# Patient Record
Sex: MALE | URBAN - METROPOLITAN AREA
[De-identification: names, ages, dates, MRNs, and addresses within clinical notes are randomized per-mention and may not be internally consistent; named-entity substitution may affect disease eponyms.]

---

## 2017-02-23 ENCOUNTER — TRANSFERRED RECORDS (OUTPATIENT)
Dept: HEALTH INFORMATION MANAGEMENT | Facility: CLINIC | Age: 62
End: 2017-02-23

## 2017-02-28 ENCOUNTER — TRANSFERRED RECORDS (OUTPATIENT)
Dept: HEALTH INFORMATION MANAGEMENT | Facility: CLINIC | Age: 62
End: 2017-02-28

## 2017-03-01 ENCOUNTER — TRANSFERRED RECORDS (OUTPATIENT)
Dept: HEALTH INFORMATION MANAGEMENT | Facility: CLINIC | Age: 62
End: 2017-03-01

## 2017-03-11 ENCOUNTER — TRANSFERRED RECORDS (OUTPATIENT)
Dept: HEALTH INFORMATION MANAGEMENT | Facility: CLINIC | Age: 62
End: 2017-03-11

## 2017-03-16 ENCOUNTER — TRANSFERRED RECORDS (OUTPATIENT)
Dept: HEALTH INFORMATION MANAGEMENT | Facility: CLINIC | Age: 62
End: 2017-03-16

## 2017-03-18 ENCOUNTER — TRANSFERRED RECORDS (OUTPATIENT)
Dept: HEALTH INFORMATION MANAGEMENT | Facility: CLINIC | Age: 62
End: 2017-03-18

## 2017-03-20 ENCOUNTER — PRE VISIT (OUTPATIENT)
Dept: ORTHOPEDICS | Facility: CLINIC | Age: 62
End: 2017-03-20

## 2017-03-20 ENCOUNTER — MEDICAL CORRESPONDENCE (OUTPATIENT)
Dept: HEALTH INFORMATION MANAGEMENT | Facility: CLINIC | Age: 62
End: 2017-03-20

## 2017-03-20 NOTE — TELEPHONE ENCOUNTER
1.  Date/reason for appt:  3/22/17  Spiral Fracture Humerus    2.  Referring provider:  Dr Leonard, [facility] Alonso Weinstein    3.  Call to patient (Yes / No - short description):  Per , addl recent records/imaging at [facility]    4.  Previous care at / records requested from:  [facility] and [facility]

## 2017-03-21 ENCOUNTER — TRANSFERRED RECORDS (OUTPATIENT)
Dept: HEALTH INFORMATION MANAGEMENT | Facility: CLINIC | Age: 62
End: 2017-03-21

## 2017-03-21 NOTE — TELEPHONE ENCOUNTER
RN calling the patient and spoke with his wife Olivia, who does all the appt scheduling.  RN asked them to bring a disc to their appointment tomorrow.  They will come today, just because they are not familiar with the area.  She will seek out the disc with imaging today.  She says that an xr of the shoulder, a bone scan and a CT was done.  Niraj has male breast cancer with bone mets.  [place] referral was also made for them today and RN provided names of hotels in the area for them also.  They have no further questions or concerns at this time.

## 2017-03-21 NOTE — TELEPHONE ENCOUNTER
Records received from [facility].   Included  Office notes: 2/13/17(left leg note), 2/23/17, 2/24/17, 2/27/17, 3/1/17  Radiology reports: bone scan on 2/23/17   CT abdomen pelvis on 2/28/17   CT chest on 2/28/17  Other: labs

## 2017-03-22 ENCOUNTER — OFFICE VISIT (OUTPATIENT)
Dept: ORTHOPEDICS | Facility: CLINIC | Age: 62
End: 2017-03-22

## 2017-03-22 VITALS — WEIGHT: 295.6 LBS | HEIGHT: 69 IN | BODY MASS INDEX: 43.78 KG/M2

## 2017-03-22 DIAGNOSIS — M84.521A PATHOLOGICAL FRACTURE OF RIGHT HUMERUS DUE TO NEOPLASTIC DISEASE, INITIAL ENCOUNTER: Primary | ICD-10-CM

## 2017-03-22 RX ORDER — VITAMIN E 268 MG
CAPSULE ORAL
COMMUNITY

## 2017-03-22 RX ORDER — ATENOLOL AND CHLORTHALIDONE TABLET 50; 25 MG/1; MG/1
1 TABLET ORAL DAILY
COMMUNITY

## 2017-03-22 RX ORDER — AMLODIPINE BESYLATE 10 MG/1
10 TABLET ORAL DAILY
Status: ON HOLD | COMMUNITY
End: 2017-05-17

## 2017-03-22 RX ORDER — NICOTINE POLACRILEX 2 MG
1 GUM BUCCAL DAILY
COMMUNITY

## 2017-03-22 RX ORDER — DOCUSATE SODIUM 100 MG/1
CAPSULE, LIQUID FILLED ORAL DAILY
COMMUNITY

## 2017-03-22 RX ORDER — MULTIVITAMIN WITH IRON
1 TABLET ORAL 3 TIMES DAILY
COMMUNITY

## 2017-03-22 RX ORDER — POTASSIUM CHLORIDE 1.5 G/1.58G
20 POWDER, FOR SOLUTION ORAL 3 TIMES DAILY
COMMUNITY

## 2017-03-22 ASSESSMENT — ENCOUNTER SYMPTOMS
JOINT SWELLING: 0
MYALGIAS: 1
NECK PAIN: 0
BACK PAIN: 0
ARTHRALGIAS: 1
MUSCLE WEAKNESS: 0
MUSCLE CRAMPS: 0
STIFFNESS: 1

## 2017-03-22 NOTE — PROGRESS NOTES
This 61-year-old man is a history of breast cancer. He has metastases into the bones. He has had this diagnosis for about 3 years. He is getting hormonal therapy. He's had some treatments that have not worked and so he is getting some more cytotoxic therapy and has experienced neutropenia. 10 days ago he was moving a very heavy barn door and broke his right humerus after putting maximal exertion into pushing the door. He has not had any numbness or tingling in the hand. He has noticed some bruising and mild swelling. I reviewed several dozen pages of outside records. I have also reviewed his patient surveys in the EMR.    On examination he is alert oriented has a normal mood and affect and is in no acute distress. His heart has a regular rate and rhythm respirations are regular and unlabored eyes are nonicteric and reactive. In his right upper extremity he's able to move the fingers and wrists fully with some discomfort. There is no edema or swelling of the hand. He has normal sensation in the hand and hand is well-perfused. Shoulder and elbow exam are deferred secondary to pain. The patient is wearing a functional brace on the upper humerus that comes over the top of the shoulder.    I reviewed his x-ray and bone scan. The x-ray shows a long oblique fracture beginning distal to the shoulder joint. Bone scan shows a sclerotic lesions involve the humeral heads. This could be the breast cancer.    This patient is currently receiving chemotherapy. He may benefit from internal fixation of the fracture. I like to get an MRI to see how involved the bone is distal to the humeral head where the bone scan was active. On the other hand this is a very long fracture and he has not been radiated. If there is significant portions of the fracture that are not involved with tumor he may have the healing capacity. In this scenario we would not need to interrupt his chemotherapy. If surgery is needed he would have to be off of  chemotherapy for 2 weeks prior in 2 weeks after the surgery to do this safely. The patient stands these considerations. I will present treatment plan with him after reviewing his MRI.

## 2017-03-22 NOTE — MR AVS SNAPSHOT
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 Bring a list of your current medicines to your exam (including vitamins, minerals and over-the-counter drugs). Also bring the results of similar scans you may have had.  Please remove any body piercings and hair extensions before you arrive.  Follow your doctor s orders. If you do not, we may have to postpone your exam.  You will not have contrast for this exam. You do not need to do anything special to prepare.  The MRI machine uses a strong magnet. Please wear clothes without metal (snaps, zippers). A sweatsuit works well, or we may give you a hospital gown.   **IMPORTANT** THE INSTRUCTIONS BELOW ARE ONLY FOR THOSE PATIENTS WHO HAVE BEEN TOLD THEY WILL RECEIVE SEDATION OR GENERAL ANESTHESIA DURING THEIR MRI PROCEDURE:  IF YOU WILL RECEIVE SEDATION (take medicine to help you relax during your exam):   You must get the medicine from your doctor before you arrive. Bring the medicine to the exam. Do not take it at home.   Arrive one hour early. Bring someone who can take you home after the test. Your medicine will make you sleepy. After the exam, you may not drive, take a bus or take a taxi by yourself.   No eating 8 hours before  your exam. You may have clear liquids up until 4 hours before your exam. (Clear liquids include water, clear tea, black coffee and fruit juice without pulp.)  IF YOU WILL RECEIVE ANESTHESIA (be asleep for your exam):   Arrive 1 1/2 hours early. Bring someone who can take you home after the test. You may not drive, take a bus or take a taxi by yourself.   No eating 8 hours before your exam. You may have clear liquids up until 4 hours before your exam. (Clear liquids include water, clear tea, black coffee and fruit juice without pulp.)   You will spend four to five hours in the recovery room.  Please call the Imaging Department at your exam site with any questions.              Future tests that were ordered for you today     Open Future Orders        Priority Expected Expires Ordered    MR Humerus Right w/o Contrast Routine  3/22/2018 3/22/2017            Who to contact     Please call your clinic at [phone] to:    Ask questions about your health    Make or cancel appointments    Discuss your medicines    Learn about your test results    Speak to your doctor   If you have compliments or concerns about an experience at your clinic, or if you wish to file a complaint, please contact [facility] Patient Relations at [phone] or email us at [email or web address]         Additional Information About Your Visit        [employer] Information     [employer] is an electronic gateway that provides easy, online access to your medical records. With [employer], you can request a clinic appointment, read your test results, renew a prescription or communicate with your care team.     To sign up for [employer] visit the website at [email or web address]   You will be asked to enter the access code listed below, as well as some personal information. Please follow the directions to create your username and password.     Your access code is: [email or web address]  Expires: 6/19/2017  6:30 AM     Your access  "code will  in 90 days. If you need help or a new code, please contact your [facility] or call [phone] for assistance.        Care EveryWhere ID     This is your Care EveryWhere ID. This could be used by other organizations to access your [place] medical records  [id number]        Your Vitals Were     Height BMI (Body Mass Index)                1.753 m (5' 9\") 43.65 kg/m2           Blood Pressure from Last 3 Encounters:   No data found for BP    Weight from Last 3 Encounters:   17 134.1 kg (295 lb 9.6 oz)               Primary Care Provider    None Specified       No primary provider on file.        Thank you!     Thank you for choosing [facility]  for your care. Our goal is always to provide you with excellent care. Hearing back from our patients is one way we can continue to improve our services. Please take a few minutes to complete the written survey that you may receive in the mail after your visit with us. Thank you!             Your Updated Medication List - Protect others around you: Learn how to safely use, store and throw away your medicines at www.disposemymeds.org.          This list is accurate as of: 3/22/17  9:04 AM.  Always use your most recent med list.                   Brand Name Dispense Instructions for use    atenolol-chlorthalidone 50-25 MG per tablet    TENORETIC     Take 1 tablet by mouth daily       Biotin 1 MG Caps          DEXAMETHASONE PO      Take 4 mg by mouth       docusate sodium 100 MG capsule    COLACE     Take by mouth daily       EMERGEN-C IMMUNE PO      Take by mouth as needed       flaxseed oil 1000 MG Caps          FOLIC ACID PO      Take 400 mcg by mouth daily       GABAPENTIN PO      Take 300 mg by mouth 2 times daily (before meals)       garlic 150 MG Tabs tablet      Take 150 mg by mouth daily       LACTOBACILLUS PO          leuprolide 30 MG kit    LUPRON DEPOT-PED     Inject 30 mg into the muscle every 4 " months       magnesium 250 MG tablet      Take 1 tablet by mouth 2 times daily       MELATONIN PO      Take 3 mg by mouth At Bedtime       NORVASC 10 MG tablet   Generic drug:  amLODIPine      Take 10 mg by mouth daily       OMEGA 3 PO      Take 1,000 mg by mouth       OMEPRAZOLE PO      Take 20 mg by mouth       potassium chloride 20 MEQ Packet    KLOR-CON     Take 20 mEq by mouth 2 times daily       PROCHLORPERAZINE MALEATE PO      Take 10 mg by mouth every 6 hours as needed for nausea or vomiting       TRAZODONE HCL PO      Take 100 mg by mouth At Bedtime       vitamin B complex with vitamin C Tabs tablet      Take 1 tablet by mouth daily       VITAMIN D-3 PO      Take 2,000 Units by mouth 2 times daily

## 2017-03-22 NOTE — LETTER
3/22/2017       RE: Niraj Pimentel  [address] [facility] [address]     Dear Colleague,    Thank you for referring your patient, Niraj Pimentel, to the [facility] at [facility] Please see a copy of my visit note below.    This 61-year-old man is a history of breast cancer. He has metastases into the bones. He has had this diagnosis for about 3 years. He is getting hormonal therapy. He's had some treatments that have not worked and so he is getting some more cytotoxic therapy and has experienced neutropenia. 10 days ago he was moving a very heavy barn door and broke his right humerus after putting maximal exertion into pushing the door. He has not had any numbness or tingling in the hand. He has noticed some bruising and mild swelling. I reviewed several dozen pages of outside records. I have also reviewed his patient surveys in the EMR.    On examination he is alert oriented has a normal mood and affect and is in no acute distress. His heart has a regular rate and rhythm respirations are regular and unlabored eyes are nonicteric and reactive. In his right upper extremity he's able to move the fingers and wrists fully with some discomfort. There is no edema or swelling of the hand. He has normal sensation in the hand and hand is well-perfused. Shoulder and elbow exam are deferred secondary to pain. The patient is wearing a functional brace on the upper humerus that comes over the top of the shoulder.    I reviewed his x-ray and bone scan. The x-ray shows a long oblique fracture beginning distal to the shoulder joint. Bone scan shows a sclerotic lesions involve the humeral heads. This could be the breast cancer.    This patient is currently receiving chemotherapy. He may benefit from internal fixation of the fracture. I like to get an MRI to see how involved the bone is distal to the humeral head where the bone scan was active. On the other hand this is a very long  fracture and he has not been radiated. If there is significant portions of the fracture that are not involved with tumor he may have the healing capacity. In this scenario we would not need to interrupt his chemotherapy. If surgery is needed he would have to be off of chemotherapy for 2 weeks prior in 2 weeks after the surgery to do this safely. The patient stands these considerations. I will present treatment plan with him after reviewing his MRI.    Again, thank you for allowing me to participate in the care of your patient.      Sincerely,    Camacho Nguyen MD

## 2017-03-24 NOTE — TELEPHONE ENCOUNTER
Received imaging disc from [facility] (xray Humerus Right-3/11/17), sent to clinic.  Duplicate

## 2017-05-04 ENCOUNTER — TRANSFERRED RECORDS (OUTPATIENT)
Dept: HEALTH INFORMATION MANAGEMENT | Facility: CLINIC | Age: 62
End: 2017-05-04

## 2017-05-12 RX ORDER — NYSTATIN 100000 U/G
1 CREAM TOPICAL DAILY PRN
COMMUNITY

## 2017-05-12 RX ORDER — SENNOSIDES 8.6 MG
1 CAPSULE ORAL 3 TIMES DAILY
COMMUNITY

## 2017-05-15 ENCOUNTER — APPOINTMENT (OUTPATIENT)
Dept: GENERAL RADIOLOGY | Facility: CLINIC | Age: 62
DRG: 493 | End: 2017-05-15
Attending: ORTHOPAEDIC SURGERY
Payer: COMMERCIAL

## 2017-05-15 ENCOUNTER — ANESTHESIA (OUTPATIENT)
Dept: SURGERY | Facility: CLINIC | Age: 62
DRG: 493 | End: 2017-05-15
Payer: COMMERCIAL

## 2017-05-15 ENCOUNTER — APPOINTMENT (OUTPATIENT)
Dept: GENERAL RADIOLOGY | Facility: CLINIC | Age: 62
DRG: 493 | End: 2017-05-15
Attending: INTERNAL MEDICINE
Payer: COMMERCIAL

## 2017-05-15 ENCOUNTER — HOSPITAL ENCOUNTER (INPATIENT)
Facility: CLINIC | Age: 62
LOS: 2 days | Discharge: HOME | DRG: 493 | End: 2017-05-17
Attending: ORTHOPAEDIC SURGERY | Admitting: ORTHOPAEDIC SURGERY
Payer: COMMERCIAL

## 2017-05-15 ENCOUNTER — ANESTHESIA EVENT (OUTPATIENT)
Dept: SURGERY | Facility: CLINIC | Age: 62
DRG: 493 | End: 2017-05-15
Payer: COMMERCIAL

## 2017-05-15 DIAGNOSIS — Z98.890 S/P SHOULDER SURGERY: Primary | ICD-10-CM

## 2017-05-15 DIAGNOSIS — R06.00 DYSPNEA, UNSPECIFIED TYPE: ICD-10-CM

## 2017-05-15 DIAGNOSIS — I10 BENIGN ESSENTIAL HYPERTENSION: ICD-10-CM

## 2017-05-15 LAB
ABO + RH BLD: NORMAL
ABO + RH BLD: NORMAL
ANION GAP SERPL CALCULATED.3IONS-SCNC: 10 MMOL/L (ref 3–14)
BLD GP AB SCN SERPL QL: NORMAL
BLOOD BANK CMNT PATIENT-IMP: NORMAL
BUN SERPL-MCNC: 19 MG/DL (ref 7–30)
CALCIUM SERPL-MCNC: 9.7 MG/DL (ref 8.5–10.1)
CHLORIDE SERPL-SCNC: 100 MMOL/L (ref 94–109)
CO2 SERPL-SCNC: 28 MMOL/L (ref 20–32)
CREAT SERPL-MCNC: 0.9 MG/DL (ref 0.66–1.25)
CREAT SERPL-MCNC: NORMAL MG/DL (ref 0.66–1.25)
GFR SERPL CREATININE-BSD FRML MDRD: 86 ML/MIN/1.7M2
GFR SERPL CREATININE-BSD FRML MDRD: NORMAL ML/MIN/1.7M2
GLUCOSE BLDC GLUCOMTR-MCNC: 121 MG/DL (ref 70–99)
GLUCOSE BLDC GLUCOMTR-MCNC: 137 MG/DL (ref 70–99)
GLUCOSE SERPL-MCNC: 139 MG/DL (ref 70–99)
MAGNESIUM SERPL-MCNC: 1.9 MG/DL (ref 1.6–2.3)
POTASSIUM SERPL-SCNC: 3.5 MMOL/L (ref 3.4–5.3)
POTASSIUM SERPL-SCNC: NORMAL MMOL/L (ref 3.4–5.3)
SODIUM SERPL-SCNC: 138 MMOL/L (ref 133–144)
SPECIMEN EXP DATE BLD: NORMAL

## 2017-05-15 PROCEDURE — 25000128 H RX IP 250 OP 636: Performed by: NURSE ANESTHETIST, CERTIFIED REGISTERED

## 2017-05-15 PROCEDURE — 71010 XR CHEST PORT 1 VW: CPT

## 2017-05-15 PROCEDURE — 88342 IMHCHEM/IMCYTCHM 1ST ANTB: CPT | Mod: 26 | Performed by: ORTHOPAEDIC SURGERY

## 2017-05-15 PROCEDURE — 71000014 ZZH RECOVERY PHASE 1 LEVEL 2 FIRST HR: Performed by: ORTHOPAEDIC SURGERY

## 2017-05-15 PROCEDURE — 40000278 XR SURGERY CARM FLUORO LESS THAN 5 MIN: Mod: TC

## 2017-05-15 PROCEDURE — 86901 BLOOD TYPING SEROLOGIC RH(D): CPT | Performed by: ORTHOPAEDIC SURGERY

## 2017-05-15 PROCEDURE — 36000066 ZZH SURGERY LEVEL 4 W FLUORO 1ST 30 MIN - UMMC: Performed by: ORTHOPAEDIC SURGERY

## 2017-05-15 PROCEDURE — 37000008 ZZH ANESTHESIA TECHNICAL FEE, 1ST 30 MIN: Performed by: ORTHOPAEDIC SURGERY

## 2017-05-15 PROCEDURE — C9290 INJ, BUPIVACAINE LIPOSOME: HCPCS | Performed by: ANESTHESIOLOGY

## 2017-05-15 PROCEDURE — 88341 IMHCHEM/IMCYTCHM EA ADD ANTB: CPT | Performed by: ORTHOPAEDIC SURGERY

## 2017-05-15 PROCEDURE — 27211024 ZZHC OR SUPPLY OTHER OPNP: Performed by: ORTHOPAEDIC SURGERY

## 2017-05-15 PROCEDURE — 25000128 H RX IP 250 OP 636: Performed by: ANESTHESIOLOGY

## 2017-05-15 PROCEDURE — 12000001 ZZH R&B MED SURG/OB UMMC

## 2017-05-15 PROCEDURE — 27210794 ZZH OR GENERAL SUPPLY STERILE: Performed by: ORTHOPAEDIC SURGERY

## 2017-05-15 PROCEDURE — 88307 TISSUE EXAM BY PATHOLOGIST: CPT | Mod: 26,59 | Performed by: ORTHOPAEDIC SURGERY

## 2017-05-15 PROCEDURE — 25000132 ZZH RX MED GY IP 250 OP 250 PS 637: Performed by: ORTHOPAEDIC SURGERY

## 2017-05-15 PROCEDURE — 40000986 XR HUMERUS PORT RT: Mod: RT

## 2017-05-15 PROCEDURE — 83735 ASSAY OF MAGNESIUM: CPT | Performed by: INTERNAL MEDICINE

## 2017-05-15 PROCEDURE — 37000009 ZZH ANESTHESIA TECHNICAL FEE, EACH ADDTL 15 MIN: Performed by: ORTHOPAEDIC SURGERY

## 2017-05-15 PROCEDURE — 25000132 ZZH RX MED GY IP 250 OP 250 PS 637: Performed by: INTERNAL MEDICINE

## 2017-05-15 PROCEDURE — 0PSC04Z REPOSITION RIGHT HUMERAL HEAD WITH INTERNAL FIXATION DEVICE, OPEN APPROACH: ICD-10-PCS | Performed by: ORTHOPAEDIC SURGERY

## 2017-05-15 PROCEDURE — 71000015 ZZH RECOVERY PHASE 1 LEVEL 2 EA ADDTL HR: Performed by: ORTHOPAEDIC SURGERY

## 2017-05-15 PROCEDURE — 25000128 H RX IP 250 OP 636: Performed by: ORTHOPAEDIC SURGERY

## 2017-05-15 PROCEDURE — 27810169 ZZH OR IMPLANT GENERAL: Performed by: ORTHOPAEDIC SURGERY

## 2017-05-15 PROCEDURE — 88342 IMHCHEM/IMCYTCHM 1ST ANTB: CPT | Mod: XU | Performed by: ORTHOPAEDIC SURGERY

## 2017-05-15 PROCEDURE — 40000170 ZZH STATISTIC PRE-PROCEDURE ASSESSMENT II: Performed by: ORTHOPAEDIC SURGERY

## 2017-05-15 PROCEDURE — 00000146 ZZHCL STATISTIC GLUCOSE BY METER IP

## 2017-05-15 PROCEDURE — 88360 TUMOR IMMUNOHISTOCHEM/MANUAL: CPT | Mod: 26,59 | Performed by: ORTHOPAEDIC SURGERY

## 2017-05-15 PROCEDURE — 86850 RBC ANTIBODY SCREEN: CPT | Performed by: ORTHOPAEDIC SURGERY

## 2017-05-15 PROCEDURE — 25000125 ZZHC RX 250: Performed by: NURSE ANESTHETIST, CERTIFIED REGISTERED

## 2017-05-15 PROCEDURE — 80048 BASIC METABOLIC PNL TOTAL CA: CPT | Performed by: INTERNAL MEDICINE

## 2017-05-15 PROCEDURE — 00000159 ZZHCL STATISTIC H-SEND OUTS PREP: Performed by: ORTHOPAEDIC SURGERY

## 2017-05-15 PROCEDURE — 25000566 ZZH SEVOFLURANE, EA 15 MIN: Performed by: ORTHOPAEDIC SURGERY

## 2017-05-15 PROCEDURE — 88307 TISSUE EXAM BY PATHOLOGIST: CPT | Performed by: ORTHOPAEDIC SURGERY

## 2017-05-15 PROCEDURE — 25000125 ZZHC RX 250: Performed by: ANESTHESIOLOGY

## 2017-05-15 PROCEDURE — 88377 M/PHMTRC ALYS ISHQUANT/SEMIQ: CPT | Performed by: PATHOLOGY

## 2017-05-15 PROCEDURE — 99222 1ST HOSP IP/OBS MODERATE 55: CPT | Performed by: INTERNAL MEDICINE

## 2017-05-15 PROCEDURE — 88341 IMHCHEM/IMCYTCHM EA ADD ANTB: CPT | Mod: 26 | Performed by: ORTHOPAEDIC SURGERY

## 2017-05-15 PROCEDURE — P9041 ALBUMIN (HUMAN),5%, 50ML: HCPCS | Performed by: NURSE ANESTHETIST, CERTIFIED REGISTERED

## 2017-05-15 PROCEDURE — C9399 UNCLASSIFIED DRUGS OR BIOLOG: HCPCS | Performed by: NURSE ANESTHETIST, CERTIFIED REGISTERED

## 2017-05-15 PROCEDURE — C1713 ANCHOR/SCREW BN/BN,TIS/BN: HCPCS | Performed by: ORTHOPAEDIC SURGERY

## 2017-05-15 PROCEDURE — 25800025 ZZH RX 258: Performed by: NURSE ANESTHETIST, CERTIFIED REGISTERED

## 2017-05-15 PROCEDURE — 88360 TUMOR IMMUNOHISTOCHEM/MANUAL: CPT | Performed by: ORTHOPAEDIC SURGERY

## 2017-05-15 PROCEDURE — 36000064 ZZH SURGERY LEVEL 4 EA 15 ADDTL MIN - UMMC: Performed by: ORTHOPAEDIC SURGERY

## 2017-05-15 PROCEDURE — 86900 BLOOD TYPING SEROLOGIC ABO: CPT | Performed by: ORTHOPAEDIC SURGERY

## 2017-05-15 DEVICE — BONE CEMENT SIMPLEX FULL DOSE 6191-1-001: Type: IMPLANTABLE DEVICE | Site: ARM | Status: FUNCTIONAL

## 2017-05-15 DEVICE — IMPLANTABLE DEVICE: Type: IMPLANTABLE DEVICE | Site: ARM | Status: FUNCTIONAL

## 2017-05-15 RX ORDER — POLYETHYLENE GLYCOL 3350 17 G/17G
17 POWDER, FOR SOLUTION ORAL DAILY
Status: DISCONTINUED | OUTPATIENT
Start: 2017-05-15 | End: 2017-05-17 | Stop reason: HOSPADM

## 2017-05-15 RX ORDER — MORPHINE SULFATE 15 MG/1
30 TABLET, FILM COATED, EXTENDED RELEASE ORAL
Status: DISCONTINUED | OUTPATIENT
Start: 2017-05-15 | End: 2017-05-17 | Stop reason: HOSPADM

## 2017-05-15 RX ORDER — ACETAMINOPHEN 325 MG/1
650 TABLET ORAL EVERY 4 HOURS PRN
Status: DISCONTINUED | OUTPATIENT
Start: 2017-05-18 | End: 2017-05-17 | Stop reason: HOSPADM

## 2017-05-15 RX ORDER — BUPIVACAINE HYDROCHLORIDE AND EPINEPHRINE 2.5; 5 MG/ML; UG/ML
INJECTION, SOLUTION INFILTRATION; PERINEURAL PRN
Status: DISCONTINUED | OUTPATIENT
Start: 2017-05-15 | End: 2017-05-15

## 2017-05-15 RX ORDER — ALBUMIN, HUMAN INJ 5% 5 %
SOLUTION INTRAVENOUS CONTINUOUS PRN
Status: DISCONTINUED | OUTPATIENT
Start: 2017-05-15 | End: 2017-05-15

## 2017-05-15 RX ORDER — HYDROMORPHONE HYDROCHLORIDE 1 MG/ML
.3-.5 INJECTION, SOLUTION INTRAMUSCULAR; INTRAVENOUS; SUBCUTANEOUS
Status: DISCONTINUED | OUTPATIENT
Start: 2017-05-15 | End: 2017-05-17 | Stop reason: HOSPADM

## 2017-05-15 RX ORDER — CYCLOBENZAPRINE HCL 5 MG
10 TABLET ORAL 3 TIMES DAILY PRN
Status: DISCONTINUED | OUTPATIENT
Start: 2017-05-15 | End: 2017-05-17 | Stop reason: HOSPADM

## 2017-05-15 RX ORDER — SODIUM CHLORIDE, SODIUM LACTATE, POTASSIUM CHLORIDE, CALCIUM CHLORIDE 600; 310; 30; 20 MG/100ML; MG/100ML; MG/100ML; MG/100ML
INJECTION, SOLUTION INTRAVENOUS CONTINUOUS
Status: DISCONTINUED | OUTPATIENT
Start: 2017-05-15 | End: 2017-05-15 | Stop reason: HOSPADM

## 2017-05-15 RX ORDER — LIDOCAINE 40 MG/G
CREAM TOPICAL
Status: DISCONTINUED | OUTPATIENT
Start: 2017-05-15 | End: 2017-05-17 | Stop reason: HOSPADM

## 2017-05-15 RX ORDER — LANOLIN ALCOHOL/MO/W.PET/CERES
400 CREAM (GRAM) TOPICAL DAILY
Status: DISCONTINUED | OUTPATIENT
Start: 2017-05-16 | End: 2017-05-17 | Stop reason: HOSPADM

## 2017-05-15 RX ORDER — TRAZODONE HYDROCHLORIDE 50 MG/1
50 TABLET, FILM COATED ORAL AT BEDTIME
Status: DISCONTINUED | OUTPATIENT
Start: 2017-05-15 | End: 2017-05-17 | Stop reason: HOSPADM

## 2017-05-15 RX ORDER — FENTANYL CITRATE 50 UG/ML
25-50 INJECTION, SOLUTION INTRAMUSCULAR; INTRAVENOUS
Status: DISCONTINUED | OUTPATIENT
Start: 2017-05-15 | End: 2017-05-15 | Stop reason: HOSPADM

## 2017-05-15 RX ORDER — ONDANSETRON 2 MG/ML
4 INJECTION INTRAMUSCULAR; INTRAVENOUS EVERY 30 MIN PRN
Status: DISCONTINUED | OUTPATIENT
Start: 2017-05-15 | End: 2017-05-15 | Stop reason: HOSPADM

## 2017-05-15 RX ORDER — PROPOFOL 10 MG/ML
INJECTION, EMULSION INTRAVENOUS PRN
Status: DISCONTINUED | OUTPATIENT
Start: 2017-05-15 | End: 2017-05-15

## 2017-05-15 RX ORDER — SODIUM CHLORIDE, SODIUM LACTATE, POTASSIUM CHLORIDE, CALCIUM CHLORIDE 600; 310; 30; 20 MG/100ML; MG/100ML; MG/100ML; MG/100ML
INJECTION, SOLUTION INTRAVENOUS CONTINUOUS PRN
Status: DISCONTINUED | OUTPATIENT
Start: 2017-05-15 | End: 2017-05-15

## 2017-05-15 RX ORDER — ONDANSETRON 4 MG/1
4 TABLET, ORALLY DISINTEGRATING ORAL EVERY 30 MIN PRN
Status: DISCONTINUED | OUTPATIENT
Start: 2017-05-15 | End: 2017-05-15 | Stop reason: HOSPADM

## 2017-05-15 RX ORDER — ONDANSETRON 2 MG/ML
4 INJECTION INTRAMUSCULAR; INTRAVENOUS EVERY 6 HOURS PRN
Status: DISCONTINUED | OUTPATIENT
Start: 2017-05-15 | End: 2017-05-17 | Stop reason: HOSPADM

## 2017-05-15 RX ORDER — FENTANYL CITRATE 50 UG/ML
INJECTION, SOLUTION INTRAMUSCULAR; INTRAVENOUS PRN
Status: DISCONTINUED | OUTPATIENT
Start: 2017-05-15 | End: 2017-05-15

## 2017-05-15 RX ORDER — ONDANSETRON 2 MG/ML
INJECTION INTRAMUSCULAR; INTRAVENOUS PRN
Status: DISCONTINUED | OUTPATIENT
Start: 2017-05-15 | End: 2017-05-15

## 2017-05-15 RX ORDER — NALOXONE HYDROCHLORIDE 0.4 MG/ML
.1-.4 INJECTION, SOLUTION INTRAMUSCULAR; INTRAVENOUS; SUBCUTANEOUS
Status: DISCONTINUED | OUTPATIENT
Start: 2017-05-15 | End: 2017-05-17 | Stop reason: HOSPADM

## 2017-05-15 RX ORDER — LANOLIN ALCOHOL/MO/W.PET/CERES
3 CREAM (GRAM) TOPICAL AT BEDTIME
Status: DISCONTINUED | OUTPATIENT
Start: 2017-05-15 | End: 2017-05-15

## 2017-05-15 RX ORDER — POTASSIUM CHLORIDE 750 MG/1
20 TABLET, EXTENDED RELEASE ORAL ONCE
Status: COMPLETED | OUTPATIENT
Start: 2017-05-15 | End: 2017-05-15

## 2017-05-15 RX ORDER — CEFAZOLIN SODIUM 2 G/100ML
2 INJECTION, SOLUTION INTRAVENOUS EVERY 8 HOURS
Status: COMPLETED | OUTPATIENT
Start: 2017-05-15 | End: 2017-05-16

## 2017-05-15 RX ORDER — OXYCODONE HYDROCHLORIDE 5 MG/1
10-15 TABLET ORAL
Status: DISCONTINUED | OUTPATIENT
Start: 2017-05-15 | End: 2017-05-16

## 2017-05-15 RX ORDER — ATENOLOL 25 MG/1
50 TABLET ORAL DAILY
Status: DISCONTINUED | OUTPATIENT
Start: 2017-05-16 | End: 2017-05-17 | Stop reason: HOSPADM

## 2017-05-15 RX ORDER — LIDOCAINE HYDROCHLORIDE 20 MG/ML
INJECTION, SOLUTION INFILTRATION; PERINEURAL PRN
Status: DISCONTINUED | OUTPATIENT
Start: 2017-05-15 | End: 2017-05-15

## 2017-05-15 RX ORDER — ONDANSETRON 4 MG/1
4 TABLET, ORALLY DISINTEGRATING ORAL EVERY 6 HOURS PRN
Status: DISCONTINUED | OUTPATIENT
Start: 2017-05-15 | End: 2017-05-17 | Stop reason: HOSPADM

## 2017-05-15 RX ORDER — NYSTATIN 100000 U/G
CREAM TOPICAL 4 TIMES DAILY PRN
Status: DISCONTINUED | OUTPATIENT
Start: 2017-05-15 | End: 2017-05-17 | Stop reason: HOSPADM

## 2017-05-15 RX ORDER — SODIUM CHLORIDE, SODIUM LACTATE, POTASSIUM CHLORIDE, CALCIUM CHLORIDE 600; 310; 30; 20 MG/100ML; MG/100ML; MG/100ML; MG/100ML
INJECTION, SOLUTION INTRAVENOUS CONTINUOUS
Status: DISCONTINUED | OUTPATIENT
Start: 2017-05-15 | End: 2017-05-17 | Stop reason: HOSPADM

## 2017-05-15 RX ORDER — NALOXONE HYDROCHLORIDE 0.4 MG/ML
.1-.4 INJECTION, SOLUTION INTRAMUSCULAR; INTRAVENOUS; SUBCUTANEOUS
Status: DISCONTINUED | OUTPATIENT
Start: 2017-05-15 | End: 2017-05-15 | Stop reason: HOSPADM

## 2017-05-15 RX ORDER — AMOXICILLIN 250 MG
1-2 CAPSULE ORAL 2 TIMES DAILY
Status: DISCONTINUED | OUTPATIENT
Start: 2017-05-15 | End: 2017-05-17 | Stop reason: HOSPADM

## 2017-05-15 RX ORDER — MEPERIDINE HYDROCHLORIDE 25 MG/ML
12.5 INJECTION INTRAMUSCULAR; INTRAVENOUS; SUBCUTANEOUS
Status: DISCONTINUED | OUTPATIENT
Start: 2017-05-15 | End: 2017-05-15 | Stop reason: HOSPADM

## 2017-05-15 RX ORDER — CEFAZOLIN SODIUM 1 G/50ML
3 SOLUTION INTRAVENOUS
Status: COMPLETED | OUTPATIENT
Start: 2017-05-15 | End: 2017-05-15

## 2017-05-15 RX ORDER — AMLODIPINE BESYLATE 5 MG/1
5 TABLET ORAL DAILY
Status: DISCONTINUED | OUTPATIENT
Start: 2017-05-16 | End: 2017-05-17 | Stop reason: HOSPADM

## 2017-05-15 RX ORDER — HYDROXYZINE HYDROCHLORIDE 25 MG/1
25 TABLET, FILM COATED ORAL EVERY 6 HOURS PRN
Status: DISCONTINUED | OUTPATIENT
Start: 2017-05-15 | End: 2017-05-15

## 2017-05-15 RX ORDER — BIOTIN 1 MG
1000 TABLET ORAL DAILY
Status: DISCONTINUED | OUTPATIENT
Start: 2017-05-16 | End: 2017-05-17 | Stop reason: HOSPADM

## 2017-05-15 RX ORDER — CEFAZOLIN SODIUM 1 G/3ML
1 INJECTION, POWDER, FOR SOLUTION INTRAMUSCULAR; INTRAVENOUS SEE ADMIN INSTRUCTIONS
Status: DISCONTINUED | OUTPATIENT
Start: 2017-05-15 | End: 2017-05-15 | Stop reason: HOSPADM

## 2017-05-15 RX ORDER — ACETAMINOPHEN 325 MG/1
975 TABLET ORAL EVERY 8 HOURS
Status: DISCONTINUED | OUTPATIENT
Start: 2017-05-15 | End: 2017-05-17 | Stop reason: HOSPADM

## 2017-05-15 RX ORDER — GABAPENTIN 300 MG/1
300 CAPSULE ORAL 3 TIMES DAILY
Status: DISCONTINUED | OUTPATIENT
Start: 2017-05-15 | End: 2017-05-17 | Stop reason: HOSPADM

## 2017-05-15 RX ADMIN — SENNOSIDES AND DOCUSATE SODIUM 2 TABLET: 8.6; 5 TABLET ORAL at 14:51

## 2017-05-15 RX ADMIN — BUPIVACAINE 10 ML: 13.3 INJECTION, SUSPENSION, LIPOSOMAL INFILTRATION at 08:31

## 2017-05-15 RX ADMIN — FENTANYL CITRATE 25 MCG: 50 INJECTION, SOLUTION INTRAMUSCULAR; INTRAVENOUS at 11:11

## 2017-05-15 RX ADMIN — OXYCODONE HYDROCHLORIDE 10 MG: 5 TABLET ORAL at 17:59

## 2017-05-15 RX ADMIN — SODIUM CHLORIDE, POTASSIUM CHLORIDE, SODIUM LACTATE AND CALCIUM CHLORIDE: 600; 310; 30; 20 INJECTION, SOLUTION INTRAVENOUS at 08:13

## 2017-05-15 RX ADMIN — OXYCODONE HYDROCHLORIDE 10 MG: 5 TABLET ORAL at 21:33

## 2017-05-15 RX ADMIN — SODIUM CHLORIDE, POTASSIUM CHLORIDE, SODIUM LACTATE AND CALCIUM CHLORIDE: 600; 310; 30; 20 INJECTION, SOLUTION INTRAVENOUS at 10:40

## 2017-05-15 RX ADMIN — MIDAZOLAM HYDROCHLORIDE 2 MG: 1 INJECTION, SOLUTION INTRAMUSCULAR; INTRAVENOUS at 08:13

## 2017-05-15 RX ADMIN — GABAPENTIN 300 MG: 300 CAPSULE ORAL at 20:25

## 2017-05-15 RX ADMIN — FENTANYL CITRATE 100 MCG: 50 INJECTION, SOLUTION INTRAMUSCULAR; INTRAVENOUS at 08:23

## 2017-05-15 RX ADMIN — FENTANYL CITRATE 50 MCG: 50 INJECTION, SOLUTION INTRAMUSCULAR; INTRAVENOUS at 10:56

## 2017-05-15 RX ADMIN — CEFAZOLIN SODIUM 2 G: 2 INJECTION, SOLUTION INTRAVENOUS at 18:43

## 2017-05-15 RX ADMIN — Medication 3 G: at 08:51

## 2017-05-15 RX ADMIN — POLYETHYLENE GLYCOL 3350 17 G: 17 POWDER, FOR SOLUTION ORAL at 20:25

## 2017-05-15 RX ADMIN — BUPIVACAINE HYDROCHLORIDE AND EPINEPHRINE BITARTRATE 8 ML: 2.5; .005 INJECTION, SOLUTION INFILTRATION; PERINEURAL at 08:32

## 2017-05-15 RX ADMIN — SUGAMMADEX 200 MG: 100 INJECTION, SOLUTION INTRAVENOUS at 11:00

## 2017-05-15 RX ADMIN — MORPHINE SULFATE 30 MG: 15 TABLET, EXTENDED RELEASE ORAL at 20:25

## 2017-05-15 RX ADMIN — ACETAMINOPHEN 975 MG: 325 TABLET, FILM COATED ORAL at 21:33

## 2017-05-15 RX ADMIN — CHOLECALCIFEROL TAB 25 MCG (1000 UNIT) 2000 UNITS: 25 TAB at 20:25

## 2017-05-15 RX ADMIN — ALBUMIN (HUMAN): 12.5 SOLUTION INTRAVENOUS at 10:02

## 2017-05-15 RX ADMIN — SODIUM CHLORIDE, POTASSIUM CHLORIDE, SODIUM LACTATE AND CALCIUM CHLORIDE: 600; 310; 30; 20 INJECTION, SOLUTION INTRAVENOUS at 17:00

## 2017-05-15 RX ADMIN — FENTANYL CITRATE 50 MCG: 50 INJECTION, SOLUTION INTRAMUSCULAR; INTRAVENOUS at 09:09

## 2017-05-15 RX ADMIN — FENTANYL CITRATE 25 MCG: 50 INJECTION, SOLUTION INTRAMUSCULAR; INTRAVENOUS at 11:05

## 2017-05-15 RX ADMIN — Medication 50 MG: at 08:41

## 2017-05-15 RX ADMIN — SENNOSIDES AND DOCUSATE SODIUM 2 TABLET: 8.6; 5 TABLET ORAL at 20:25

## 2017-05-15 RX ADMIN — FENTANYL CITRATE 50 MCG: 50 INJECTION, SOLUTION INTRAMUSCULAR; INTRAVENOUS at 11:35

## 2017-05-15 RX ADMIN — CEFAZOLIN 1 G: 1 INJECTION, POWDER, FOR SOLUTION INTRAMUSCULAR; INTRAVENOUS at 10:51

## 2017-05-15 RX ADMIN — ACETAMINOPHEN 975 MG: 325 TABLET, FILM COATED ORAL at 14:51

## 2017-05-15 RX ADMIN — PROPOFOL 200 MG: 10 INJECTION, EMULSION INTRAVENOUS at 08:41

## 2017-05-15 RX ADMIN — HYDROMORPHONE HYDROCHLORIDE 0.3 MG: 1 INJECTION, SOLUTION INTRAMUSCULAR; INTRAVENOUS; SUBCUTANEOUS at 12:14

## 2017-05-15 RX ADMIN — POTASSIUM CHLORIDE 20 MEQ: 750 TABLET, FILM COATED, EXTENDED RELEASE ORAL at 17:59

## 2017-05-15 RX ADMIN — HYDROMORPHONE HYDROCHLORIDE 0.3 MG: 1 INJECTION, SOLUTION INTRAMUSCULAR; INTRAVENOUS; SUBCUTANEOUS at 12:02

## 2017-05-15 RX ADMIN — ONDANSETRON 4 MG: 2 INJECTION INTRAMUSCULAR; INTRAVENOUS at 10:44

## 2017-05-15 RX ADMIN — TRAZODONE HYDROCHLORIDE 50 MG: 50 TABLET ORAL at 21:33

## 2017-05-15 RX ADMIN — OXYCODONE HYDROCHLORIDE 10 MG: 5 TABLET ORAL at 14:51

## 2017-05-15 RX ADMIN — LIDOCAINE HYDROCHLORIDE 60 MG: 20 INJECTION, SOLUTION INFILTRATION; PERINEURAL at 08:41

## 2017-05-15 NOTE — ANESTHESIA CARE TRANSFER NOTE
Patient: Niraj Pimentel    Procedure(s):  Right Proximal Humerus Tumor Curettage and Internal Fixation of Pathologic Fracture - Wound Class: I-Clean    Diagnosis: Pathologic Fracture Right Humerus  Diagnosis Additional Information: No value filed.    Anesthesia Type:   General, ETT     Note:  Airway :Face Mask  Patient transferred to:PACU        Vitals: (Last set prior to Anesthesia Care Transfer)    CRNA VITALS  5/15/2017 1052 - 5/15/2017 1127      5/15/2017             Resp Rate (set): 10                Electronically Signed By: Charles Patrick Schlatter, APRN CRNA  May 15, 2017  11:27 AM

## 2017-05-15 NOTE — OR NURSING
Pain under control from block but continues to have pain under his arm.  Toterating X rays and general movement in bed

## 2017-05-15 NOTE — ANESTHESIA POSTPROCEDURE EVALUATION
Patient: Niraj Pimentel    Procedure(s):  Right Proximal Humerus Tumor Curettage and Internal Fixation of Pathologic Fracture - Wound Class: I-Clean    Diagnosis:Pathologic Fracture Right Humerus  Diagnosis Additional Information: No value filed.    Anesthesia Type:  General, ETT    Note:  Anesthesia Post Evaluation    Patient location during evaluation: PACU and Bedside  Patient participation: Able to fully participate in evaluation  Level of consciousness: awake and alert  Pain management: adequate  Airway patency: patent  Cardiovascular status: acceptable  Respiratory status: acceptable  Hydration status: balanced  PONV: none     Anesthetic complications: None          Last vitals:  Vitals:    05/15/17 1430 05/15/17 1500 05/15/17 1525   BP: 111/64 143/69    Resp:   23   Temp:   36.7  C (98  F)   SpO2:   93%         Electronically Signed By: Bhargavi Yoon MD  May 15, 2017  4:09 PM

## 2017-05-15 NOTE — IP AVS SNAPSHOT
MRN:[id number]                      After Visit Summary   5/15/2017    Niraj Pimentel    MRN: [id number]           Thank you!     Thank you for choosing [facility] for your care. Our goal is always to provide you with excellent care. Hearing back from our patients is one way we can continue to improve our services. Please take a few minutes to complete the written survey that you may receive in the mail after you visit with us. Thank you!        Patient Information     Date Of Birth          1955        Designated Caregiver       Most Recent Value    Caregiver    Will someone help with your care after discharge? yes    Name of designated caregiver Olivia Pimentel    Phone number of caregiver [phone]    Caregiver address [address]      About your hospital stay     You were admitted on:  May 15, 2017 You last received care in the:  [facility] 8[facility]    You were discharged on:  May 17, 2017        Reason for your hospital stay       61M with metastatic breast cancer and pathologic humerus fx s/p ORIF                  Who to Call     For medical emergencies, please call 911.  For non-urgent questions about your medical care, please call your primary care provider or clinic, [phone]  For questions related to your surgery, please call your surgery clinic        Attending Provider     Provider Camacho Marie MD Orthopaedic Surgery       Primary Care Provider Office Phone # Fax #    Genaro AVINA Aly [phone]       [place] [id number]        After Care Instructions     Discharge Instructions       Post Operative Instructions for Niraj Pimentel is a 61 year old male    Surgery: Right humerus ORIF on 5/15/17    If you have any questions contact Dr. Nguyen at the following number: [facility] call [phone].    Follow up appointment:   You are scheduled to follow up with Dr. Nguyen approximately 2weeks after your surgery.            Anticoagulation:   Continue to walk and stay active to help minimize your risk of blood clot.    Activity:   -continue non weight bearing to right upper extremity, active ROM to shoulder, continue with active elbow/wrist/hand.   -use the sling as needed for comfort    Pain Medications:   -Take pain medications as prescribed.     -Do not drive while on pain medications or until your leg feels well enough to do so.      Bandage Care and Showering:   -You can shower with the adhesive bandage covering your shoulder at the time of discharge.    -Remove the adhesive bandage 10 days after your surgery.  This can be removed at home.  Once removed, it is common to have a few scabs.  Let the scabs fall off on their own - they will do so over the next week or two.  The sutures are resorbable and nothing needs to be removed.    --Once the bandage is removed, you can shower without covering the incision.  Do not soak or bathe the incision in water.  Do not scrub the incision.  Just let the water from the shower run over the incision.    --Contact Dr. Nguyen or his staff if there is any drainage from the incision or redness.    Shoulder Swelling and Icing  -Continue icing the shoulder 5-6 times per day for approximately 20 minutes each time.  This will help with swelling.  Keep the arm elevated with pillows to prevent dependent swelling.    Contact clinic if you note any of the following:  -Fevers greater than 101.5 chills  -Increased pain, redness, swelling or discharge at the surgical site.   -During regular business hours call Dr Nguyen's office and request to speak with his nurse or the triage nurses.   -After hours or on weekends call the hospital  at [phone] and ask to speak with the Orthopaedic resident on call.            Discharge Instructions       Monitor oxygen sat at night.  Notify MD if consistently < 88%   Overnight oximetry tracing tonight on RA -assess O2 requirement  Resume tenoretic and  "potassium 5/18  Primary MD appointment 5/18 or 5/19 to recheck BP, BMP, hgb and oxygen levels.  Continued use of incentive spirometer.            Oxygen Adult       Discharge Instructions        Monitor oxygen sat at night.  Notify MD if consistently < 88%   Overnight oximetry tracing tonight on RA -assess O2 requirement  Primary MD appointment 5/18 or 5/19 to recheck BP, BMP, hgb and oxygen levels.  Continued use of incentive spirometer.                  Your next 10 appointments already scheduled     May 31, 2017  8:45 AM CDT   (Arrive by 8:30 AM)   Return Visit with Camacho Nguyen MD   [facility] ([facility])    [address]  4th Floor  [facility] [address] [phone]              Pending Results     Date and Time Order Name Status Description    5/15/2017 0934 Surgical pathology exam In process             Statement of Approval     Ordered          05/17/17 1041  I have reviewed and agree with all the recommendations and orders detailed in this document.  EFFECTIVE NOW     Approved and electronically signed by:  Camacho Nguyen MD           05/17/17 1213  I have reviewed and agree with all the recommendations and orders detailed in this document.     Approved and electronically signed by:  Oswald Oreilly MD             Admission Information     Date & Time Provider Department Dept. Phone    5/15/2017 Camacho Nguyen MD [facility] 8A [phone]      Your Vitals Were     Blood Pressure Pulse Temperature Respirations Height Weight    143/77 97 98.4  F (36.9  C) 16 1.778 m (5' 10\") 130.5 kg (287 lb 11.2 oz)    Pulse Oximetry BMI (Body Mass Index)                94% 41.28 kg/m2          [employer] Information     [employer] lets you send messages to your doctor, view your test results, renew your prescriptions, schedule appointments and more. To sign up, go to [email or web address] . Click on \"Log in\" on the left side of the " "screen, which will take you to the Welcome page. Then click on \"Sign up Now\" on the right side of the page.     You will be asked to enter the access code listed below, as well as some personal information. Please follow the directions to create your username and password.     Your access code is: [email or web address]  Expires: 2017  6:30 AM     Your access code will  in 90 days. If you need help or a new code, please call your [place] clinic or [phone].        Care EveryWhere ID     This is your Care EveryWhere ID. This could be used by other organizations to access your [place] medical records  [id number]           Review of your medicines      CONTINUE these medicines which may have CHANGED, or have new prescriptions. If we are uncertain of the size of tablets/capsules you have at home, strength may be listed as something that might have changed.        Dose / Directions    amLODIPine 5 MG tablet   Commonly known as:  NORVASC   This may have changed:    - medication strength  - how much to take   Used for:  Benign essential hypertension        Dose:  5 mg   Take 1 tablet (5 mg) by mouth daily   Quantity:  15 tablet   Refills:  0       traZODone 50 MG tablet   Commonly known as:  DESYREL   This may have changed:  how much to take        Dose:  50 mg   Take 1 tablet (50 mg) by mouth At Bedtime   Quantity:  90 tablet   Refills:  0         CONTINUE these medicines which have NOT CHANGED        Dose / Directions    atenolol-chlorthalidone 50-25 MG per tablet   Commonly known as:  TENORETIC        Dose:  1 tablet   Take 1 tablet by mouth daily   Refills:  0       Biotin 1 MG Caps        Dose:  1 tablet   Take 1 tablet by mouth daily   Refills:  0       docusate sodium 100 MG capsule   Commonly known as:  COLACE        Take by mouth daily   Refills:  0       EMERGEN-C IMMUNE PO        Take by mouth as needed   Refills:  0       flaxseed oil 1000 MG Caps        Refills:  0       FOLIC ACID PO        Dose:  400 " mcg   Take 400 mcg by mouth daily   Refills:  0       GABAPENTIN PO        Dose:  300 mg   Take 300 mg by mouth 3 times daily   Refills:  0       garlic 150 MG Tabs tablet        Dose:  150 mg   Take 150 mg by mouth daily   Refills:  0       LACTOBACILLUS PO        Dose:  1 tablet   Take 1 tablet by mouth daily   Refills:  0       leuprolide 30 MG kit   Commonly known as:  LUPRON DEPOT-PED        Dose:  30 mg   Inject 30 mg into the muscle every 4 months Last dose 2/2017   Refills:  0       magnesium 250 MG tablet        Dose:  1 tablet   Take 1 tablet by mouth 3 times daily   Refills:  0       MELATONIN PO        Dose:  3 mg   Take 3 mg by mouth At Bedtime   Refills:  0       MORPHINE SULFATE PO        Dose:  30 mg   Take 30 mg by mouth 2 times daily   Refills:  0       nystatin cream   Commonly known as:  MYCOSTATIN        Dose:  1 applicator   Apply 1 applicator topically daily as needed for dry skin   Refills:  0       OMEGA 3 PO        Dose:  1000 mg   Take 1,000 mg by mouth   Refills:  0       OMEPRAZOLE PO        Dose:  20 mg   Take 20 mg by mouth   Refills:  0       OXYCODONE HCL PO        Dose:  5 mg   Take 5 mg by mouth every 4 hours as needed   Refills:  0       potassium chloride 20 MEQ Packet   Commonly known as:  KLOR-CON        Dose:  20 mEq   Take 20 mEq by mouth 3 times daily   Refills:  0       Senna 8.6 MG Caps        Dose:  1 tablet   Take 1 tablet by mouth 3 times daily   Refills:  0       vitamin B complex with vitamin C Tabs tablet        Dose:  1 tablet   Take 1 tablet by mouth daily   Refills:  0       VITAMIN D-3 PO        Dose:  2000 Units   Take 2,000 Units by mouth 2 times daily   Refills:  0         STOP taking     CEFUROXIME AXETIL PO           DEXAMETHASONE PO           PROCHLORPERAZINE MALEATE PO                Where to get your medicines      These medications were sent to [facility] [place], [place] - [address]  [address] [address]      Phone:  [phone]     amLODIPine 5 MG tablet                Protect others around you: Learn how to safely use, store and throw away your medicines at www.disposemymeds.org.             Medication List: This is a list of all your medications and when to take them. Check marks below indicate your daily home schedule. Keep this list as a reference.      Medications           Morning Afternoon Evening Bedtime As Needed    amLODIPine 5 MG tablet   Commonly known as:  NORVASC   Take 1 tablet (5 mg) by mouth daily   Last time this was given:  5 mg on 5/17/2017  9:01 AM                                atenolol-chlorthalidone 50-25 MG per tablet   Commonly known as:  TENORETIC   Take 1 tablet by mouth daily                                Biotin 1 MG Caps   Take 1 tablet by mouth daily                                docusate sodium 100 MG capsule   Commonly known as:  COLACE   Take by mouth daily                                EMERGEN-C IMMUNE PO   Take by mouth as needed                                flaxseed oil 1000 MG Caps                                FOLIC ACID PO   Take 400 mcg by mouth daily   Last time this was given:  400 mcg on 5/17/2017  9:01 AM                                GABAPENTIN PO   Take 300 mg by mouth 3 times daily   Last time this was given:  300 mg on 5/17/2017  1:27 PM                                garlic 150 MG Tabs tablet   Take 150 mg by mouth daily                                LACTOBACILLUS PO   Take 1 tablet by mouth daily                                leuprolide 30 MG kit   Commonly known as:  LUPRON DEPOT-PED   Inject 30 mg into the muscle every 4 months Last dose 2/2017                                magnesium 250 MG tablet   Take 1 tablet by mouth 3 times daily                                MELATONIN PO   Take 3 mg by mouth At Bedtime                                MORPHINE SULFATE PO   Take 30 mg by mouth 2 times daily                                nystatin cream   Commonly  known as:  MYCOSTATIN   Apply 1 applicator topically daily as needed for dry skin                                OMEGA 3 PO   Take 1,000 mg by mouth                                OMEPRAZOLE PO   Take 20 mg by mouth   Last time this was given:  20 mg on 5/17/2017  6:42 AM                                OXYCODONE HCL PO   Take 5 mg by mouth every 4 hours as needed   Last time this was given:  10 mg on 5/16/2017  6:14 AM                                potassium chloride 20 MEQ Packet   Commonly known as:  KLOR-CON   Take 20 mEq by mouth 3 times daily                                Senna 8.6 MG Caps   Take 1 tablet by mouth 3 times daily                                traZODone 50 MG tablet   Commonly known as:  DESYREL   Take 1 tablet (50 mg) by mouth At Bedtime   Last time this was given:  50 mg on 5/16/2017 10:04 PM                                vitamin B complex with vitamin C Tabs tablet   Take 1 tablet by mouth daily   Last time this was given:  1 tablet on 5/17/2017  9:02 AM                                VITAMIN D-3 PO   Take 2,000 Units by mouth 2 times daily

## 2017-05-15 NOTE — BRIEF OP NOTE
Orthopaedic Surgery Brief Op-Note     Patient: Niraj Pimentel  : 1955  Date of Service: 5/15/2017 11:04 AM    Pre-operative Diagnosis: right proximal humerus pathologic fracture  Post-operative Diagnosis: same    Procedure(s) Performed:   1.  Right proximal humerus ORIF  2.  Cement packing proximal humerus    Staff: Dr. Nguyen  Resident: Nadine  Assistant: Prabhakar    Anesthesia: Interscalene block, General  EBL: 150 cc  Tourniquet Time: n/a     Implants: [place] proximal humerus plate locking 8 plate, 12 x 3.5 screws  Drains: none  Intra-op Labs/Cxs/Specimens: proximal humerus lesion  Complications: none apparent  Findings: see dictated op note    Dispo: Stable to PACU, then to floor    Post-Op Plan:  Assessment/Plan: Niraj Pimentel is a 61M with hx metastatic breast cancer with pathologic right humerus fracture now s/p right proximal humerus ORIF with cement packing on 5/15/2017 with Dr. Nguyen.    Activity: Up with assist, 5 lbs lifting restriction  Weight bearing status: NWB RUE,    Antibiotics: Ancef x 24 hours.  Diet: Begin with clear fluids and progress diet as tolerated.   DVT prophylaxis: mechanical only  Bracing/Splinting: sling for comfort  Wound Care: aquacel x 7 days    Pain management: interscalene block, transition from IV to orals as tolerated.    X-rays: XR PACU right proximal humerus  Physical Therapy: sling for comfort, AROM only, no PROM, ADL's.   Occupational Therapy: eval and treat  Labs: Trend Hgb on POD #1, 2, 3   Cultures: Pending, follow culture results closely.   Consults: PT, OT. medicine, appreciate assistance in caring for this patient.   Follow-up: Clinic with Dr. Nguyen in 2 weeks     Disposition: Pending progress with therapies, pain control on orals, and medical stability, anticipate discharge to home on POD #2-3.    Future Appointments  Date Time Provider Department [place]   2017 8:00 AM Tara Rai OT UROT [place]   2017 1:00 PM Madalyn Ballard OT UROT  [place]   5/17/2017 8:30 AM Emilie Ely, PT URPT [place]   5/31/2017 8:45 AM Camacho Nguyen MD [facility]       Bry Mccoy MD  Orthopaedic Surgery PGY-4  [phone]      For questions about this patient, please attempt to contact me at my pager prior to contacting the orthopaedics resident on call. Thank you!

## 2017-05-15 NOTE — IP AVS SNAPSHOT
"    UR 8A: [phone]                                              INTERAGENCY TRANSFER FORM - PHYSICIAN ORDERS   5/15/2017                    Hospital Admission Date: 5/15/2017  JO GATES   : 1955  Sex: Male        Attending Provider: Camacho Nguyen MD     Allergies:  Lisinopril    Infection:  None   Service:  ORTHOPEDICS    Ht:  1.778 m (5' 10\")   Wt:  130.5 kg (287 lb 11.2 oz)   Admission Wt:  130.5 kg (287 lb 11.2 oz)    BMI:  41.28 kg/m 2   BSA:  2.54 m 2            Patient PCP Information     Provider PCP Type    [facility]      ED Clinical Impression     Diagnosis Description Comment Added By Time Added    S/P shoulder surgery [Z98.890] S/P shoulder surgery [Z98.890]  Bry Mccoy MD 2017  7:24 AM    Benign essential hypertension [I10] Benign essential hypertension [I10]  Oswald Oreilly MD 2017 12:03 PM    Dyspnea, unspecified type [R06.00] Dyspnea, unspecified type [R06.00]  Makenzie Hoang RN 2017 12:50 PM      Hospital Problems as of 2017              Priority Class Noted POA    S/P shoulder surgery Medium  5/15/2017 Yes      Non-Hospital Problems as of 2017     None      Code Status History     Date Active Date Inactive Code Status Order ID Comments User Context    2017  7:25 AM  Full Code [id number]  Bry Mccoy MD Outpatient    5/15/2017  1:29 PM 2017  7:25 AM Full Code [id number]  Bry Mccoy MD Inpatient         Medication Review      CONTINUE these medications which may have CHANGED, or have new prescriptions. If we are uncertain of the size of tablets/capsules you have at home, strength may be listed as something that might have changed.        Dose / Directions Comments    amLODIPine 5 MG tablet   Commonly known as:  NORVASC   This may have changed:    - medication strength  - how much to take   Used for:  Benign essential hypertension        Dose:  5 mg   Take 1 tablet (5 mg) by mouth " daily   Quantity:  15 tablet   Refills:  0        traZODone 50 MG tablet   Commonly known as:  DESYREL   This may have changed:  how much to take        Dose:  50 mg   Take 1 tablet (50 mg) by mouth At Bedtime   Quantity:  90 tablet   Refills:  0          CONTINUE these medications which have NOT CHANGED        Dose / Directions Comments    atenolol-chlorthalidone 50-25 MG per tablet   Commonly known as:  TENORETIC        Dose:  1 tablet   Take 1 tablet by mouth daily   Refills:  0        Biotin 1 MG Caps        Dose:  1 tablet   Take 1 tablet by mouth daily   Refills:  0        docusate sodium 100 MG capsule   Commonly known as:  COLACE        Take by mouth daily   Refills:  0        EMERGEN-C IMMUNE PO        Take by mouth as needed   Refills:  0        flaxseed oil 1000 MG Caps        Refills:  0        FOLIC ACID PO        Dose:  400 mcg   Take 400 mcg by mouth daily   Refills:  0        GABAPENTIN PO        Dose:  300 mg   Take 300 mg by mouth 3 times daily   Refills:  0        garlic 150 MG Tabs tablet        Dose:  150 mg   Take 150 mg by mouth daily   Refills:  0        LACTOBACILLUS PO        Dose:  1 tablet   Take 1 tablet by mouth daily   Refills:  0        leuprolide 30 MG kit   Commonly known as:  LUPRON DEPOT-PED        Dose:  30 mg   Inject 30 mg into the muscle every 4 months Last dose 2/2017   Refills:  0        magnesium 250 MG tablet        Dose:  1 tablet   Take 1 tablet by mouth 3 times daily   Refills:  0        MELATONIN PO        Dose:  3 mg   Take 3 mg by mouth At Bedtime   Refills:  0        MORPHINE SULFATE PO        Dose:  30 mg   Take 30 mg by mouth 2 times daily   Refills:  0        nystatin cream   Commonly known as:  MYCOSTATIN        Dose:  1 applicator   Apply 1 applicator topically daily as needed for dry skin   Refills:  0        OMEGA 3 PO        Dose:  1000 mg   Take 1,000 mg by mouth   Refills:  0        OMEPRAZOLE PO        Dose:  20 mg   Take 20 mg by mouth   Refills:  0         OXYCODONE HCL PO        Dose:  5 mg   Take 5 mg by mouth every 4 hours as needed   Refills:  0        potassium chloride 20 MEQ Packet   Commonly known as:  KLOR-CON        Dose:  20 mEq   Take 20 mEq by mouth 3 times daily   Refills:  0        Senna 8.6 MG Caps        Dose:  1 tablet   Take 1 tablet by mouth 3 times daily   Refills:  0        vitamin B complex with vitamin C Tabs tablet        Dose:  1 tablet   Take 1 tablet by mouth daily   Refills:  0        VITAMIN D-3 PO        Dose:  2000 Units   Take 2,000 Units by mouth 2 times daily   Refills:  0          STOP taking     CEFUROXIME AXETIL PO           DEXAMETHASONE PO           PROCHLORPERAZINE MALEATE PO                   Summary of Visit     Reason for your hospital stay       61M with metastatic breast cancer and pathologic humerus fx s/p ORIF             After Care     Discharge Instructions       Post Operative Instructions for Niraj Pimentel is a 61 year old male    Surgery: Right humerus ORIF on 5/15/17    If you have any questions contact Dr. Nguyen at the following number: [facility] call [phone].    Follow up appointment:   You are scheduled to follow up with Dr. Nguyen approximately 2weeks after your surgery.           Anticoagulation:   Continue to walk and stay active to help minimize your risk of blood clot.    Activity:   -continue non weight bearing to right upper extremity, active ROM to shoulder, continue with active elbow/wrist/hand.   -use the sling as needed for comfort    Pain Medications:   -Take pain medications as prescribed.     -Do not drive while on pain medications or until your leg feels well enough to do so.      Bandage Care and Showering:   -You can shower with the adhesive bandage covering your shoulder at the time of discharge.    -Remove the adhesive bandage 10 days after your surgery.  This can be removed at home.  Once removed, it is common to have a few scabs.  Let the scabs fall off on their own - they will  do so over the next week or two.  The sutures are resorbable and nothing needs to be removed.    --Once the bandage is removed, you can shower without covering the incision.  Do not soak or bathe the incision in water.  Do not scrub the incision.  Just let the water from the shower run over the incision.    --Contact Dr. Nguyen or his staff if there is any drainage from the incision or redness.    Shoulder Swelling and Icing  -Continue icing the shoulder 5-6 times per day for approximately 20 minutes each time.  This will help with swelling.  Keep the arm elevated with pillows to prevent dependent swelling.    Contact clinic if you note any of the following:  -Fevers greater than 101.5 chills  -Increased pain, redness, swelling or discharge at the surgical site.   -During regular business hours call Dr Nguyen's office and request to speak with his nurse or the triage nurses.   -After hours or on weekends call the hospital  at [phone] and ask to speak with the Orthopaedic resident on call.       Discharge Instructions       Monitor oxygen sat at night.  Notify MD if consistently < 88%   Overnight oximetry tracing tonight on RA -assess O2 requirement  Resume tenoretic and potassium 5/18  Primary MD appointment 5/18 or 5/19 to recheck BP, BMP, hgb and oxygen levels.  Continued use of incentive spirometer.             Procedures     Oxygen Adult       Discharge Instructions        Monitor oxygen sat at night.  Notify MD if consistently < 88%   Overnight oximetry tracing tonight on RA -assess O2 requirement  Primary MD appointment 5/18 or 5/19 to recheck BP, BMP, hgb and oxygen levels.  Continued use of incentive spirometer.   O2 overnight Oximetry Study             Your next 10 appointments already scheduled     May 31, 2017  8:45 AM CDT   (Arrive by 8:30 AM)   Return Visit with Camacho Nguyen MD   [facility] ([facility])    [address] [place] [address]  [facility] [address]   [phone]              Statement of Approval     Ordered          05/17/17 1041  I have reviewed and agree with all the recommendations and orders detailed in this document.  EFFECTIVE NOW     Approved and electronically signed by:  Camacho Nguyen MD           05/17/17 1213  I have reviewed and agree with all the recommendations and orders detailed in this document.     Approved and electronically signed by:  Oswald Oreilly MD

## 2017-05-15 NOTE — LETTER
Transition Communication Hand-off for Care Transitions to Next Level of Care Provider    Name: Niraj Pimentel  MRN #: [id number]  Primary Care Provider: JOSE DEMPSEY     Primary Clinic: [facility] [address] [place] [address] [address]  [facility] [address]     Reason for Hospitalization:  Pathologic Fracture Right Humerus  S/P shoulder surgery  Admit Date/Time: 5/15/2017  5:34 AM  Discharge Date: 5/147/2017  Payor Source: Payor: COMMERCIAL / Plan: MEDICARE ADVANTAGE / Product Type: Medicare /          Reason for Communication Hand-off Referral: Avoidable readmission within 30 days    Discharge Needs Assessment:  Needs       Most Recent Value    Equipment Currently Used at Home shower chair    Transportation Available family or friend will provide      Follow-up plan:  Future Appointments  Date Time Provider Department Center   5/31/2017 8:45 AM Camacho Nguyen MD [facility]     Procedures     Future Labs/Procedures    Oxygen Adult     Scheduling Instructions:    O2 overnight Oximetry Study    Comments:    Discharge Instructions        Monitor oxygen sat at night.  Notify MD if consistently < 88%   Overnight oximetry tracing tonight on RA -assess O2 requirement  Primary MD appointment 5/18 or 5/19 to recheck BP, BMP, hgb and oxygen levels.  Continued use of incentive spirometer.                Makenzie Hoang RN, BSN  Care Coordinator, 8A  Phone [phone]  Pager [phone]    AVS/Discharge Summary is the source of truth; this is a helpful guide for improved communication of patient story

## 2017-05-15 NOTE — ANESTHESIA PREPROCEDURE EVALUATION
Anesthesia Evaluation     . Pt has had prior anesthetic. Type: General    No history of anesthetic complications          ROS/MED HX    ENT/Pulmonary:  - neg pulmonary ROS    (-) sleep apnea   Neurologic:  - neg neurologic ROS     Cardiovascular:     (+) hypertension----. : . . . :. .       METS/Exercise Tolerance:     Hematologic:  - neg hematologic  ROS       Musculoskeletal:  - neg musculoskeletal ROS       GI/Hepatic:     (+) GERD Asymptomatic on medication,       Renal/Genitourinary:  - ROS Renal section negative       Endo:  - neg endo ROS       Psychiatric:  - neg psychiatric ROS       Infectious Disease:  - neg infectious disease ROS       Malignancy:   (+) Malignancy (Rt Humerus) History of Other          Other:                     Physical Exam  Normal systems: dental    Airway   Mallampati: II  TM distance: >3 FB  Neck ROM: full    Dental     Cardiovascular       Pulmonary                     Anesthesia Plan      History & Physical Review  History and physical reviewed and following examination; no interval change.    ASA Status:  2 .    NPO Status:  > 8 hours and > 2 hours    Plan for General and ETT with Intravenous induction. Maintenance will be Balanced.    PONV prophylaxis:  Ondansetron (or other 5HT-3)  Additional equipment: Videolaryngoscope and 2nd IV Pt to consider post op block for pain control.  Pt and wife deny snoring or MIAN. Reviewed GA risks.  All questions answered.  Pt agrees with plan and wishes to proceed.      Postoperative Care  Postoperative pain management:  IV analgesics and Oral pain medications.      Consents  Anesthetic plan, risks, benefits and alternatives discussed with:  Patient and Spouse..                          .

## 2017-05-15 NOTE — IP AVS SNAPSHOT
[facility] 8A    [address]    [facility] [address]    Phone:  [phone]                                       After Visit Summary   5/15/2017    Niraj Pimentel    MRN: [id number]           After Visit Summary Signature Page     I have received my discharge instructions, and my questions have been answered. I have discussed any challenges I see with this plan with the nurse or doctor.    ..........................................................................................................................................  Patient/Patient Representative Signature      ..........................................................................................................................................  Patient Representative Print Name and Relationship to Patient    ..................................................               ................................................  Date                                            Time    ..........................................................................................................................................  Reviewed by Signature/Title    ...................................................              ..............................................  Date                                                            Time

## 2017-05-15 NOTE — ANESTHESIA PROCEDURE NOTES
Peripheral Nerve Block Procedure Note    Staff:     Anesthesiologist:  SHWETHA ARECHIGA  Location: OR Before induction  Procedure Start/Stop TImes:     patient identified, IV checked, site marked, risks and benefits discussed, informed consent, monitors and equipment checked, pre-op evaluation, at physician/surgeon's request and post-op pain management      Correct Patient: Yes      Correct Position: Yes      Correct Site: Yes      Correct Procedure: Yes      Correct Laterality:  Yes    Site Marked:  Yes  Procedure details:     Procedure:  Interscalene    Laterality:  Right    Position:  Supine    Sterile Prep: chloraprep, alcohol swabs and mask      Local skin infiltration:  1% lidocaine    amount (mL):  1    Needle:  Insulated and short bevel    Needle gauge:  21    Needle length (inches):  4    Ultrasound: Yes      Ultrasound used to identify targeted nerve, plexus, or vascular structure and placed a needle adjacent to it      Permanent Image entered into patiient's record      Abnormal pain on injection: No      Blood Aspirated: No      Paresthesias:  No    Bolus via:  Needle    Infusion Method:  Single Shot    Complications:  None  Assessment/Narrative:     Injection made incrementally with aspirations every (mL):  5     Done in OR because surgeon was late to arrive for surgery and did not have any note or order regarding a block in patient's chart.     Shwetha Arechiga MD  May 15, 2017

## 2017-05-16 ENCOUNTER — APPOINTMENT (OUTPATIENT)
Dept: OCCUPATIONAL THERAPY | Facility: CLINIC | Age: 62
DRG: 493 | End: 2017-05-16
Attending: ORTHOPAEDIC SURGERY
Payer: COMMERCIAL

## 2017-05-16 LAB
ANION GAP SERPL CALCULATED.3IONS-SCNC: 9 MMOL/L (ref 3–14)
BUN SERPL-MCNC: 12 MG/DL (ref 7–30)
CALCIUM SERPL-MCNC: 8.6 MG/DL (ref 8.5–10.1)
CHLORIDE SERPL-SCNC: 99 MMOL/L (ref 94–109)
CO2 SERPL-SCNC: 31 MMOL/L (ref 20–32)
CREAT SERPL-MCNC: 0.74 MG/DL (ref 0.66–1.25)
ERYTHROCYTE [DISTWIDTH] IN BLOOD BY AUTOMATED COUNT: 21.3 % (ref 10–15)
GFR SERPL CREATININE-BSD FRML MDRD: ABNORMAL ML/MIN/1.7M2
GLUCOSE SERPL-MCNC: 125 MG/DL (ref 70–99)
HCT VFR BLD AUTO: 26.5 % (ref 40–53)
HGB BLD-MCNC: 8.1 G/DL (ref 13.3–17.7)
MAGNESIUM SERPL-MCNC: 1.8 MG/DL (ref 1.6–2.3)
MCH RBC QN AUTO: 32.4 PG (ref 26.5–33)
MCHC RBC AUTO-ENTMCNC: 30.6 G/DL (ref 31.5–36.5)
MCV RBC AUTO: 106 FL (ref 78–100)
PLATELET # BLD AUTO: 210 10E9/L (ref 150–450)
POTASSIUM SERPL-SCNC: 3.3 MMOL/L (ref 3.4–5.3)
RBC # BLD AUTO: 2.5 10E12/L (ref 4.4–5.9)
SODIUM SERPL-SCNC: 139 MMOL/L (ref 133–144)
WBC # BLD AUTO: 8.9 10E9/L (ref 4–11)

## 2017-05-16 PROCEDURE — 40000133 ZZH STATISTIC OT WARD VISIT: Performed by: OCCUPATIONAL THERAPIST

## 2017-05-16 PROCEDURE — 97535 SELF CARE MNGMENT TRAINING: CPT | Mod: GO | Performed by: OCCUPATIONAL THERAPIST

## 2017-05-16 PROCEDURE — 25000132 ZZH RX MED GY IP 250 OP 250 PS 637: Performed by: ORTHOPAEDIC SURGERY

## 2017-05-16 PROCEDURE — 12000001 ZZH R&B MED SURG/OB UMMC

## 2017-05-16 PROCEDURE — 36592 COLLECT BLOOD FROM PICC: CPT | Performed by: INTERNAL MEDICINE

## 2017-05-16 PROCEDURE — 25000128 H RX IP 250 OP 636: Performed by: ORTHOPAEDIC SURGERY

## 2017-05-16 PROCEDURE — 25000125 ZZHC RX 250: Performed by: INTERNAL MEDICINE

## 2017-05-16 PROCEDURE — 80048 BASIC METABOLIC PNL TOTAL CA: CPT | Performed by: INTERNAL MEDICINE

## 2017-05-16 PROCEDURE — 85027 COMPLETE CBC AUTOMATED: CPT | Performed by: INTERNAL MEDICINE

## 2017-05-16 PROCEDURE — 99231 SBSQ HOSP IP/OBS SF/LOW 25: CPT | Performed by: NURSE PRACTITIONER

## 2017-05-16 PROCEDURE — 25000132 ZZH RX MED GY IP 250 OP 250 PS 637: Performed by: INTERNAL MEDICINE

## 2017-05-16 PROCEDURE — 97110 THERAPEUTIC EXERCISES: CPT | Mod: GO | Performed by: OCCUPATIONAL THERAPIST

## 2017-05-16 PROCEDURE — 97165 OT EVAL LOW COMPLEX 30 MIN: CPT | Mod: GO | Performed by: OCCUPATIONAL THERAPIST

## 2017-05-16 PROCEDURE — 83735 ASSAY OF MAGNESIUM: CPT | Performed by: INTERNAL MEDICINE

## 2017-05-16 PROCEDURE — 99232 SBSQ HOSP IP/OBS MODERATE 35: CPT | Performed by: INTERNAL MEDICINE

## 2017-05-16 RX ORDER — HEPARIN SODIUM (PORCINE) LOCK FLUSH IV SOLN 100 UNIT/ML 100 UNIT/ML
5 SOLUTION INTRAVENOUS
Status: DISCONTINUED | OUTPATIENT
Start: 2017-05-16 | End: 2017-05-17 | Stop reason: HOSPADM

## 2017-05-16 RX ORDER — POTASSIUM CHLORIDE 750 MG/1
40 TABLET, EXTENDED RELEASE ORAL ONCE
Status: COMPLETED | OUTPATIENT
Start: 2017-05-16 | End: 2017-05-16

## 2017-05-16 RX ORDER — OXYCODONE HYDROCHLORIDE 5 MG/1
5-10 TABLET ORAL
Status: DISCONTINUED | OUTPATIENT
Start: 2017-05-16 | End: 2017-05-17 | Stop reason: HOSPADM

## 2017-05-16 RX ORDER — HEPARIN SODIUM,PORCINE 10 UNIT/ML
5-10 VIAL (ML) INTRAVENOUS
Status: DISCONTINUED | OUTPATIENT
Start: 2017-05-16 | End: 2017-05-17 | Stop reason: HOSPADM

## 2017-05-16 RX ORDER — HEPARIN SODIUM,PORCINE 10 UNIT/ML
5-10 VIAL (ML) INTRAVENOUS EVERY 24 HOURS
Status: DISCONTINUED | OUTPATIENT
Start: 2017-05-16 | End: 2017-05-17 | Stop reason: HOSPADM

## 2017-05-16 RX ADMIN — MORPHINE SULFATE 30 MG: 15 TABLET, EXTENDED RELEASE ORAL at 09:42

## 2017-05-16 RX ADMIN — POTASSIUM CHLORIDE 40 MEQ: 750 TABLET, FILM COATED, EXTENDED RELEASE ORAL at 09:45

## 2017-05-16 RX ADMIN — SODIUM CHLORIDE, PRESERVATIVE FREE 5 ML: 5 INJECTION INTRAVENOUS at 14:56

## 2017-05-16 RX ADMIN — OXYCODONE HYDROCHLORIDE 10 MG: 5 TABLET ORAL at 06:14

## 2017-05-16 RX ADMIN — MORPHINE SULFATE 30 MG: 15 TABLET, EXTENDED RELEASE ORAL at 16:08

## 2017-05-16 RX ADMIN — B-COMPLEX W/ C & FOLIC ACID TAB 1 TABLET: TAB at 09:42

## 2017-05-16 RX ADMIN — ACETAMINOPHEN 975 MG: 325 TABLET, FILM COATED ORAL at 22:04

## 2017-05-16 RX ADMIN — SODIUM CHLORIDE, POTASSIUM CHLORIDE, SODIUM LACTATE AND CALCIUM CHLORIDE: 600; 310; 30; 20 INJECTION, SOLUTION INTRAVENOUS at 02:43

## 2017-05-16 RX ADMIN — SENNOSIDES AND DOCUSATE SODIUM 1 TABLET: 8.6; 5 TABLET ORAL at 09:43

## 2017-05-16 RX ADMIN — OMEPRAZOLE 20 MG: 20 CAPSULE, DELAYED RELEASE ORAL at 16:08

## 2017-05-16 RX ADMIN — GABAPENTIN 300 MG: 300 CAPSULE ORAL at 22:04

## 2017-05-16 RX ADMIN — CHOLECALCIFEROL TAB 25 MCG (1000 UNIT) 2000 UNITS: 25 TAB at 19:58

## 2017-05-16 RX ADMIN — POLYETHYLENE GLYCOL 3350 17 G: 17 POWDER, FOR SOLUTION ORAL at 09:46

## 2017-05-16 RX ADMIN — OMEPRAZOLE 20 MG: 20 CAPSULE, DELAYED RELEASE ORAL at 09:43

## 2017-05-16 RX ADMIN — CHOLECALCIFEROL TAB 25 MCG (1000 UNIT) 2000 UNITS: 25 TAB at 09:41

## 2017-05-16 RX ADMIN — GABAPENTIN 300 MG: 300 CAPSULE ORAL at 16:08

## 2017-05-16 RX ADMIN — OXYCODONE HYDROCHLORIDE 10 MG: 5 TABLET ORAL at 00:47

## 2017-05-16 RX ADMIN — ACETAMINOPHEN 975 MG: 325 TABLET, FILM COATED ORAL at 14:12

## 2017-05-16 RX ADMIN — GABAPENTIN 300 MG: 300 CAPSULE ORAL at 09:43

## 2017-05-16 RX ADMIN — ACETAMINOPHEN 975 MG: 325 TABLET, FILM COATED ORAL at 06:14

## 2017-05-16 RX ADMIN — ACETAMINOPHEN 400 MCG: 400 TABLET ORAL at 09:44

## 2017-05-16 RX ADMIN — CEFAZOLIN SODIUM 2 G: 2 INJECTION, SOLUTION INTRAVENOUS at 02:36

## 2017-05-16 RX ADMIN — SENNOSIDES AND DOCUSATE SODIUM 2 TABLET: 8.6; 5 TABLET ORAL at 19:58

## 2017-05-16 RX ADMIN — TRAZODONE HYDROCHLORIDE 50 MG: 50 TABLET ORAL at 22:04

## 2017-05-16 NOTE — PROGRESS NOTES
05/16/17 0800   Quick Adds   Type of Visit Initial Occupational Therapy Evaluation   Living Environment   Lives With spouse   Living Arrangements house  (3 level house;)   Home Accessibility bed and bath on same level;stairs within home;stairs to enter home;ramps present at home  (lives on main level)   Number of Stairs to Enter Home 0  (has a ramp if needed)   Number of Stairs Within Home 0  (Has everything he needs on one level.)   Transportation Available family or friend will provide   Living Environment Comment Pt. lives with his wife in a 3 level house, however he has everything he needs on one level.   Self-Care   Dominant Hand right   Usual Activity Tolerance moderate   Current Activity Tolerance fair   Regular Exercise no   Equipment Currently Used at Home shower chair   Activity/Exercise/Self-Care Comment Prior to admit, pt. has been limited past 2 months with non functional R UE due to fx and has worn brace 24/7 and wife assisted with all ADL's.   Functional Level Prior   Ambulation 0-->independent   Transferring 0-->independent   Toileting 2-->assistive person   Bathing 2-->assistive person   Dressing 2-->assistive person   Eating 2-->assistive person   Communication 0-->understands/communicates without difficulty   Swallowing 0-->swallows foods/liquids without difficulty   Cognition 0 - no cognition issues reported   Fall history within last six months no   Which of the above functional risks had a recent onset or change? ambulation;transferring;toileting;bathing;dressing   Prior Functional Level Comment Prior to admit, pt. has been limited in R UE function due to fx and has worn brace for 2 months 24/7 and had assist from wife for all ADL's.   General Information   Onset of Illness/Injury or Date of Surgery - Date 05/15/17   Additional Occupational Profile Info/Pertinent History of Current Problem Pt. is s/p R proximal humerus ORIF due to pathalogic fx.   Weight-Bearing Status - RUE nonweight-bearing    Cognitive Status Examination   Orientation orientation to person, place and time   Level of Consciousness alert   Able to Follow Commands WNL/WFL   Personal Safety (Cognitive) WNL/WFL   Pain Assessment   Patient Currently in Pain Yes, see Vital Sign flowsheet   Posture   Posture not impaired   Range of Motion (ROM)   ROM Comment R UE now without AROM restrictions post op; no PROM R UE; R UE limited due to non functional use past 2 months; L UE with decreased shoulder ROM.   Strength   Strength Comments R UE strength decreased due to non-functional use past 2 months   Mobility   Bed Mobility Comments Pt. did not complete any out of bed activity during am session.   Transfer Skills   Transfer Comments Pt. declined out of bed activity during am session. He needs at least SBA for all mobility due to limited use of shoulder still post op.   Tub/Shower Transfer   Tub/Shower Transfer Comments Pt. has walk in shower at home.   Instrumental Activities of Daily Living (IADL)   IADL Comments Wife was completing all due to pt. unable to use R UE past 2 months.   Activities of Daily Living Analysis   Impairments Contributing to Impaired Activities of Daily Living flexibility decreased;pain;post surgical precautions;ROM decreased;strength decreased   General Therapy Interventions   Planned Therapy Interventions ADL retraining;bed mobility training;ROM;strengthening;transfer training;home program guidelines;progressive activity/exercise   Clinical Impression   Criteria for Skilled Therapeutic Interventions Met yes, treatment indicated   OT Diagnosis Decreased independence with functional mobility and ADL's due to impaired R UE function.   Assessment of Occupational Performance 3-5 Performance Deficits   Identified Performance Deficits Decreased R UE ROM and strength, decreased functional mobility, impaired ADL's for dressing, toileting, and bathing.   Clinical Decision Making (Complexity) Low complexity   Therapy Frequency daily  "  Predicted Duration of Therapy Intervention (days/wks) 2 days   Anticipated Equipment Needs at Discharge shower chair   Anticipated Discharge Disposition Home with Assist   Risks and Benefits of Treatment have been explained. Yes   Patient, Family & other staff in agreement with plan of care Yes   [facility] TM \"6 Clicks\"   2016, Trustees of [facility], under license to [employer].  All rights reserved.   6 Clicks Short Forms Daily Activity Inpatient Short Form   [facility]  \"6 Clicks\" Daily Activity Inpatient Short Form   1. Putting on and taking off regular lower body clothing? 2 - A Lot   2. Bathing (including washing, rinsing, drying)? 2 - A Lot   3. Toileting, which includes using toilet, bedpan or urinal? 2 - A Lot   4. Putting on and taking off regular upper body clothing? 2 - A Lot   5. Taking care of personal grooming such as brushing teeth? 3 - A Little   6. Eating meals? 3 - A Little   Daily Activity Raw Score (Score out of 24.Lower scores equate to lower levels of function) 14   Total Evaluation Time   Total Evaluation Time (Minutes) 10     "

## 2017-05-16 NOTE — PLAN OF CARE
Problem: Goal Outcome Summary  Goal: Goal Outcome Summary  PT: Per OT, pt with no PT needs. Will complete orders at this time but remain open to re-consult if needed. OT to continue to follow.

## 2017-05-16 NOTE — PROGRESS NOTES
"[facility] Internal Medicine Progress Note            Interval History:   Record reviewed.  Seen with RN.  Clinically feels better today.  Dyspnea less.  Adequate pain control with wear off effect from interscalene block.  Off O2 earlier.  Dyspnea after up to BR with lying more supine.  Actually no correlating compromise with activity.  No dizziness, CP, appreciable cough.  No nausea, reflux, abd pain.  BM last night.  Voiding OK.  Sats PTA ccas drop into 80s but no correlating dyspnea.             Medications:   All medications reviewed today          Physical Exam:   Blood pressure 105/58, pulse 88, temperature 99.1  F (37.3  C), temperature source Oral, resp. rate 17, height 1.778 m (5' 10\"), weight 130.5 kg (287 lb 11.2 oz), SpO2 91 %.    Intake/Output Summary (Last 24 hours) at 05/16/17 1516  Last data filed at 05/16/17 0104   Gross per 24 hour   Intake                0 ml   Output              300 ml   Net             -300 ml       General:  Alert.  Appropriate.  No distress.  On O2 3 liters.     Heent:      Neck:    Skin:    Chest:  Decrease BS right base otherwise clear.      Cardiac:  Reg without gallop, murmur.  No JVD.     Abdomen:  Non distended, soft, non tender.  BS normal.     Extremities:  Perfused.  Trace edema.  No calf, thigh tenderness to suggest DVT.     Neuro:            Data:     Results for orders placed or performed during the hospital encounter of 05/15/17 (from the past 24 hour(s))   XR Chest Port 1 View    Narrative    CHEST PORTABLE ONE VIEW    5/15/2017 4:05 PM     HISTORY: Dyspnea postop.    COMPARISON: None.      Impression    IMPRESSION: Shallow inspiratory chest showing no gross abnormalities.  Osseous structures appear slightly hyperdense.     OUSMANE WHEELER MD   Glucose by meter   Result Value Ref Range    Glucose 137 (H) 70 - 99 mg/dL   Glucose by meter   Result Value Ref Range    Glucose 121 (H) 70 - 99 mg/dL   Basic metabolic panel   Result Value Ref Range    Sodium 139 " 133 - 144 mmol/L    Potassium 3.3 (L) 3.4 - 5.3 mmol/L    Chloride 99 94 - 109 mmol/L    Carbon Dioxide 31 20 - 32 mmol/L    Anion Gap 9 3 - 14 mmol/L    Glucose 125 (H) 70 - 99 mg/dL    Urea Nitrogen 12 7 - 30 mg/dL    Creatinine 0.74 0.66 - 1.25 mg/dL    GFR Estimate >90  Non  GFR Calc   >60 mL/min/1.7m2    GFR Estimate If Black >90   GFR Calc   >60 mL/min/1.7m2    Calcium 8.6 8.5 - 10.1 mg/dL   Magnesium   Result Value Ref Range    Magnesium 1.8 1.6 - 2.3 mg/dL   CBC with platelets   Result Value Ref Range    WBC 8.9 4.0 - 11.0 10e9/L    RBC Count 2.50 (L) 4.4 - 5.9 10e12/L    Hemoglobin 8.1 (L) 13.3 - 17.7 g/dL    Hematocrit 26.5 (L) 40.0 - 53.0 %     (H) 78 - 100 fl    MCH 32.4 26.5 - 33.0 pg    MCHC 30.6 (L) 31.5 - 36.5 g/dL    RDW 21.3 (H) 10.0 - 15.0 %    Platelet Count 210 150 - 450 10e9/L                Assessment and Plan:   1. Open reduction internal fixation right proximal humerus with cement packing. Indication: Right proximal humerus pathologic fracture. Adequate pain control.   2. Metastatic breast cancer diagnosed 06/2014:   a. Right mastectomy.   b. Chemotherapy with last round completed 3 weeks preop. Associated neutropenia.   c. Metastatic disease with diffuse skeletal metastases, lymph node, hepatic and possibly left lung involvement with what sounds like prior left-sided thoracentesis.   3. History of hypercalcemia associated with malignancy.   4. Acute blood loss anemia, adequate.   5. History of left-sided Bell's palsy (persistent on exam).   6. Recent completion of antibiotics for left lower extremity cellulitis.   7. Hypokalemia secondary to diuretic therapy.   8. Hypertension, adequate control on reduced meds.   9. Peripheral neuropathy, presumably related to chemotherapy.   10. History of positive PPD, status post protracted antibiotics.   11. Gastroesophageal reflux disease on omeprazole.   12. Borderline diabetes with A1c of 6.1%.   13. Dyspnea  postop - seems more consistent with restriction related to elevated right hemidiaphragm (noted on CXR and by exam), aggravated by supine position and hypoventilation contributed to by sedation related to opiates.  Baseline intermittent sat drop PTA.  Appears improved.      PLAN:  1)  Pt to try MS contin without oxycodone.  2)  IS, mobilize, bowel meds.  3) Replace K.  Trend labs.  Monitor clinically.            Attestation:  I have reviewed today's vital signs, notes, medications, labs and imaging.     Oswald Oreilly MD

## 2017-05-16 NOTE — PROGRESS NOTES
SPIRITUAL HEALTH SERVICES  SPIRITUAL ASSESSMENT Progress Note  [facility] 8A     REFERRAL SOURCE: Patient request for hospital  at admission.    Introduced [facility] to Kar and Olivia.  Kar shared that his hospitalization is going well and that he did not need any chaplaincy support at this time.  He knows he can request chaplaincy support through his nurse should he identify any need.    PLAN: No follow up planned. [facility] remains available for the duration of Kar's hospitalization.     Lenin Poole MDiv.  Chaplain Resident  Pager [phone]

## 2017-05-16 NOTE — PROGRESS NOTES
"REGIONAL ANESTHESIA PAIN SERVICE  SUBJECTIVE: Pt reports postop pain well controlled with interscalene nerve block, nonopioid and opioid pain medications.  Patient reports residual numbness right thumb, that he thinks may be baseline chemo-induced peripheral neuropathy.  Denies any weakness, paresthesias, circumoral numbness, metallic taste or tinnitus.  Patient is currently tolerating regular diet, denies nausea.     Clinically Aligned Pain Assessment (CAPA):  Comfort (How is your pain?): Tolerable with discomfort  Change in Pain (Since your last medication/intervention?): About the same  Pain Control (How are your pain treatments working?):  Partially effective pain control      OBJECTIVE:    Blood pressure 105/58, pulse 88, temperature 99.1  F (37.3  C), temperature source Oral, resp. rate 17, height 1.778 m (5' 10\"), weight 130.5 kg (287 lb 11.2 oz), SpO2 91 %.  Strength 5/5 and grossly symmetric in bilateral LE      ASSESSMENT/PLAN:    Niraj Pimentel is a 61 year old male with history of metastatic breast cancer with pathologic right humerus fracture 3/11/2017, repari of fracture delayed for chemotherapy.  Pt POD #1 s/p  OPEN REDUCTION INTERNAL FIXATION HUMERUS PROXIMAL} with single shot RIGHT Interscalene nerve block injection, bupivacaine 0.25% with epinephrine 1:200,000 8 mL, then liposome bupivacaine (Exparel) 1.3% 10mL given on 5/15 for postoperative analgesia.  No weakness or paresthesias.  No evidence of adverse side effects associated with nerve block injection. Pt is receiving adequate incisional pain control.  Anticipate up to 72 hours of incisional pain control.  Anticipate patient will require opioid/nonopioid analgesics for visceral and muscle pain not controlled with local anesthetic.        - NO other local anesthetic use within 96 hours of liposome bupivacaine (Exparel)  - patient received verbal and written instructions about liposome bupivacaine  - please call if questions or concerns  - " discussed plan with attending anesthesiologist    DANIEL Cook [facility]  Regional Anesthesia Pain Service  5/16/2017 10:09 AM    24 hour Job Code Pager.  For in-house use only.     Dial * * [phone] and  [place]:  [phone]  [place]: [phone]  Peds:  [phone]  Enter call-back number  May text page using [employer], but NOT [employer].

## 2017-05-16 NOTE — CONSULTS
MEDICAL CONSULTATION       DATE OF ADMISSION, EXAMINATION AND DICTATION:  05/15/2017.       Patient of Dr. Camacho Nguyen on 8A.       HISTORY OF PRESENT ILLNESS:  Niraj Pimentel is a 61-year-old male with metastatic breast cancer who underwent right proximal humerus ORIF with cement packing of the proximal humerus earlier today by Dr. Camacho Nguyen from Orthopedic Surgery.  We were asked to see the patient postoperatively for Internal Medicine consultation to include assistance with perioperative fluid and pain management as well as to evaluate patient and direct intervention with regard to postop dyspnea.      Significant history of right-sided breast cancer diagnosed in 06/2014.  Underwent mastectomy at that time.  A PET scan unremarkable.  Bell's palsy evaluated with a head CT demonstrating skull mets.  Subsequent bone scan demonstrating diffuse skeletal metastases.  The patient has been on chemotherapy since that time.  Course complicated by neutropenia.  Completed last course of chemotherapy 3 weeks ago (unable to recollect drugs).  Docetaxel and gemcitabine per record.  In early March, the patient apparently sustained a pathologic right proximal humerus fracture while attempting to move a heavy barn door.  Subsequent brace mobilization.  MS Contin and p.r.n. oxycodone for pain, the patient discontinuing the latter a few weeks ago.  Ultimate surgical intervention earlier today (as above).  Procedure tolerated well under general anesthesia with a right interscalene block.  Relatively stable hemodynamics.  Estimated blood loss 230 mL.  Received 250 mL of 5% albumin and 1200 mL of lactated Ringer's.  Tolerable level of pain control presently with a dull ache.  No chest discomfort.  Has felt dyspneic since the surgery with reduced shortness of breath with sitting in an upright posture.  Cough in the PACU, which has since resolved.  No secretion production.  No pleuritic chest pain.  Moderate obesity.  Prior  "snoring which resolved with weight loss.  Never diagnosed with sleep apnea.  Prior to admission, the patient again developed exertional chest discomfort.  No dyspnea on exertion except with overt physical activity.  The patient was able to walk 1/4 mile prior to the right upper extremity fracture.  Able to presently walk up to 1 block or up a flight of stairs as well as up and down hills at home without chest pain or brush overdose of breath.  No knowledge regarding coronary artery disease.  No prior stress testing.  No nausea.      No prior issues with anesthesia.  No history of abnormal bleeding tendency or blood clots.  Prior transfusion a few weeks ago without reaction.  A 4-day course of steroids with chemo 1 month ago.  No other systemic steroids.  No ongoing upper respiratory infectious or flu-like illness.      With regard to metastatic disease, the patient indicates that he did require fluid removal from the left lung previously.  He has also had lymph node and hepatic involvement with planned target radiation therapy for the latter.      PAST MEDICAL HISTORY:   1.  Breast cancer metastatic to bone, liver, lymph nodes and potentially left lung.   2.  Pathologic right humerus fracture (as above).   3.  Cellulitis involving the left foot.  Recent completion of 20 days of antibiotics with resolution.   4.  Hypercalcemia of malignancy.   5.  Hypokalemia attributed to diuretic therapy.   6.  Peripheral neuropathy, presumably related to chemotherapy.   7.  Hypertension.  Did take a.m. meds.   8.  Left-sided Bell's palsy (as above).   9.  History of positive PPD treated with what sounds like protracted antibiotics.   10.  Gastroesophageal reflux disease, symptomatically controlled on omeprazole.   11.  Borderline diabetes with hemoglobin A1c of 6.1%.      PAST SURGICAL HISTORY:   1.  \"Rebuilding\" of left ankle, 09/2016.   2.  Right mastectomy (as above).   3.  Arthroscopic knee surgery.   4.  Carpal tunnel " release.   5.  Ulnar nerve transposition.   6.  Umbilical hernia repair.      Without known coronary artery disease diagnosed diabetes, asthma, intrinsic renal disease, peptic ulcer disease, hepatitis, gallbladder disease, thyroid disease, seizure, or anemia.      ALLERGIES:  Lisinopril (cough).      MEDICATIONS PRIOR TO ADMISSION:   1.  Amlodipine 10 mg daily with last dose this morning.    2.  Tenoretic 25 mg 1 tablet daily with last dose on 05/14.    3.  Biotin 1 mg daily.    4.  Cefuroxime axetil 500 mg b.i.d. with last dose on 05/14.     5.  Vitamin D3 2000 units twice daily.   6.  Dexamethasone last administered on 04/21 with presumed chemo.   7.  Colace 100 mg daily.   8.  Flaxseed oil 1000 mg capsules presumed daily.   9.  Folic acid 400 mcg daily.   10.  Neurontin 300 mg t.i.d.    11.  Garlic 150 mg daily.   12.  Lactobacillus 1 tablet daily.   13.  Lupron injection every 4 months.    14.  Magnesium 1 tablet 3 times daily.   15.  Melatonin 3 mg at bedtime.   16.  Multivitamin as needed.   17.  Nystatin cream as needed.   18.  Fish oil 1000 mg daily.   19.  Omeprazole 20 mg daily.   20.  Oxycodone 5 mg q.4 h. p.r.n.    21.  Klor-Con 20 mEq t.i.d.   22.  Prochlorperazine 10 mg q.6 h. p.r.n. nausea.   23.  Senna 1 tablet 3 times daily.   24.  Trazodone 100 mg at bedtime, last dose on 05/14.     25.  Vitamin B complex with vitamin C 1 tablet daily.   26.  MS Contin 30 mg q.12 h.      FAMILY HISTORY:  Adopted.      HABITS:  Nonsmoker.  Rare alcohol intake.      SOCIAL HISTORY:  .  Two children.  Retired .      REVIEW OF SYSTEMS:  Denies fever, chills or sweats.  No headache.  Has had dizziness after a prolonged ride characterized as a dysequilibrium attributed to the above chemo.  No palpitations.  No nausea or vomiting.  No reflux or abdominal pain, no diarrhea or constipation.  No signs of GI blood loss.  Last bowel movement on 05/14.  Voiding spontaneously with PVR postop 179.  Denies  other bone pain.  Yeast infection correlating with the right upper extremity brace, resolved per patient.  Resolution of left lower extremity cellulitis.  Subjective weakness, right upper extremity with distal upper and lower extremity paresthesias related to the above neuropathy.      OBJECTIVE:   GENERAL:  Mildly overweight adult male sitting upright in bed in no distress.  Alert and oriented.   VITAL SIGNS:  Blood pressure 109/55, heart rate 70s and regular, respirations nonlabored, O2 sat 93% on O2, 3 liters by nasal cannula.  Temperature normal.   HEENT:  Extraocular movements full.  No nystagmus.  Pupils equal and reacting symmetrically.  Sclerae nonicteric.  Fundi deferred.  Oropharynx is clear.  Dentition adequate repair.  Mucosal membranes are dry.  Carotid upstroke brisk.  No bruits.  There is no thyromegaly or lymphadenopathy.   SKIN:  No rash (exposed areas).   CHEST:  Demonstrates reduced breath sounds right lung base posteriorly.  No rales, rhonchi or bronchospasm.   CARDIAC:  Regular without audible gallop or murmur.  No click, no jugular venous distention.   ABDOMEN:  Nondistended, soft, nontender, without palpable organomegaly or mass.  Bowel sounds are present.  No epigastric or ileac bruits.   EXTREMITIES:  Distal lower extremities are well perfused.  There is no cyanosis or clubbing.  There is 2+ left pedal, 1-2+ pretibial edema.   MUSCULOSKELETAL:  No posterior calf or thigh tenderness/induration to suggest DVT.   GENITALIA AND RECTAL:  Exam deferred.   NEUROLOGIC:  Grossly nonfocal except for residual effect from upper right interscalene block.      LABORATORY DATA:  On 03/16/2017, potassium 3.6, glucose 129, chloride 96, calcium 9.2, albumin 3.4, bilirubin 0.5, CO2 of 31, ALT of 254, AST of 80, anion gap of 8.  GFR 76, alkaline phosphates 116, BUN 17, creatinine 1.0, sodium 135.  Magnesium 1.9.  On 03/09/2017 white count was 5000 with hemoglobin 11.5 grams percent, platelet count of 324,000.   On  white count was 1.8 with hemoglobin of 12.1, platelet count 235,000.  On 2017, sodium 137, potassium 3.6, BUN 18, creatinine 1.1, ALT of 61, AST of 33, albumin 3.4, calcium 9.7, glucose 135.  Magnesium 1.9.  White count elevated at 24,200 (question steroid effect).  Hemoglobin 10, platelet count 131,000.  EKG 2016 demonstrated right bundle branch block pattern with nonspecific T-wave abnormality and a first degree AV block.      ASSESSMENT:  A 61-year-old male admitted with the followin.  Open reduction internal fixation right proximal humerus with cement packing.  Indication:  Right proximal humerus pathologic fracture.  Tolerable level of pain control with residual effect from interscalene nerve block.   2.  Metastatic breast cancer diagnosed 2014:     a.  Right mastectomy.   b.  Chemotherapy with last round completed 3 weeks preop.  Associated neutropenia.   c.  Metastatic disease with diffuse skeletal metastases, lymph node, hepatic and possibly left lung involvement with what sounds like prior left-sided thoracentesis.   3.  History of hypercalcemia associated with malignancy.   4.  Potential for acute blood loss anemia.   5.  History of left-sided Bell's palsy (persistent on exam).   6.  Recent completion of antibiotics for left lower extremity cellulitis.   7.  Hypokalemia secondary to diuretic therapy.   8.  Hypertension, adequate control.   9.  Peripheral neuropathy, presumably related to chemotherapy.   10.  History of positive PPD, status post protracted antibiotics.   11.  Gastroesophageal reflux disease on omeprazole.   12.  Borderline diabetes with A1c of 6.1%.   13.  Dyspnea postop with what patient describes as a restricted ability to take a deep inspiratory effort.  Suspect potential for baseline subclinical sleep apnea with obesity and prior history of snoring; however, this did resolve with weight loss.  Hypoventilation contributed to by residual sedation from  administered anesthesia and opiates.  Further compounded by elevation of right hemidiaphragm, suspected on exam secondary to the interscalene nerve block.  Will obtain chest x-ray to ensure no fluid collections (with what sounds like prior thoracentesis) or other parenchymal change.   14.  Surgeries as above.      PLAN:   1.  Intravenous fluid support until adequate p.o. intake.   2.  Parameters for which nursing staff should call.   3.  Serial followup labs.   4.  Incentive spirometry/continuous oximetry.   5.  Review/resume prior to admission meds as appropriate.  Would continue atenolol holding diuretic for now.  Norvasc with parameters.   6.  Review opiate regimen as per Orthopedics.   7.  Bowel meds.   8.  Perioperative antibiotics as per Orthopedics.   9.  Mechanical DVT prophylaxis.   10.  Chest x-ray (as above).   11.  Add BMP/magnesium to today's labs.   12.  See if potassium replaced.   13.  Close clinical monitoring.      Thank you for the consultation.  Will follow along with you.         FOUZIA HERNANDEZ MD             D: 05/15/2017 15:44   T: 05/15/2017 19:30   MT: JENA      Name:     JO GATES   MRN:      [id number]        Account:       [id number]   :      1955           Consult Date:  05/15/2017      Document: [id number]

## 2017-05-16 NOTE — OP NOTE
DATE OF PROCEDURE:  05/15/2017       PREOPERATIVE DIAGNOSIS:  Right humerus pathologic fracture.      POSTOPERATIVE DIAGNOSIS:  Right humerus pathologic fracture.      PROCEDURES:   1.  Curettage of right humerus tumor.   2.  Internal fixation of right humerus with a plate.      SURGEON:  Camacho Nguyen MD      ASSISTANTS:  Emilie Radford PA-C and Bry Mccoy MD, R4      Two experienced assistants were required on this case in order to get our exposure and put in the hardware due to the patient's size and extent of the lesion.      INDICATIONS:  Mr. Niraj Pimentel has a history of metastatic breast cancer to the humerus.  He presents now to have his painful nonhealing fracture fixed understanding the risks of bleeding, infection, pain, numbness, tingling, weakness, stiffness, deep venous thrombosis and pulmonary embolism.      DESCRIPTION OF PROCEDURE:  The patient was taken to the operating room, placed in supine position and then underwent successful induction of general anesthesia.  The right arm was washed and sterilely prepped and draped.  We made a deltopectoral incision and exposed the humerus.  I opened up the fracture site, cleaned out some tumor from the canal and from the femoral head.  There were some areas with very hard sclerotic bone mixed with lytic areas.  We sent the specimen to pathology.  I then copiously irrigated and we placed on a proximal humerus locking plate.  We placed pins into the humeral head and a screw into the shaft of the humerus.  Eventually we were able to get the reduction that we were happy with, so I put 2 locking screws in the head and a locking screw in the shaft for 2 screws above and below the fracture site.  We were happy with the position of the fracture, and so I then began drilling and measuring additional screws for the humeral head which were set aside and then put some additional screws distally in the bone filling the distal 4 holes and I measured 2 screws  for the proximal 4 holes.  Next I again washed out the portion of the bone that we had access to and passed a curet a few more times into the head and shaft of the humerus.      I injected cement into the shaft and the head of the humerus.  We then placed our measured screws into the head and shaft as well and removed excess cement.  We were happy with the position of the screws on the C-arm.  I then changed out my initial cortical screw in the shaft for a locking screw.  We were very pleased with the rotation and position of the bone.  We copiously irrigated and closed with Vicryl in the fascia and subcutaneous tissue, followed by Monocryl in the skin, Steri-Strips and sterile dry dressing.  The patient was extubated and taken to the recovery room in stable condition.      ESTIMATED BLOOD LOSS:  150 mL        COMPLICATIONS:  There were no complications.         ARABELLA TORO MD             D: 05/15/2017 14:36   T: 2017 05:56   MT: helder      Name:     JO GATES   MRN:      [id number]        Account:        [id number]   :      1955           Procedure Date: 05/15/2017      Document: [id number]

## 2017-05-16 NOTE — PLAN OF CARE
Problem: Goal Outcome Summary  Goal: Goal Outcome Summary  Outcome: Improving  [id number]: VSS on 3LPM via NC. Pt states pain is tolerable and pain control is adequate. Given only scheduled medications - BP meds held due to BP med parameters not met. Ice to shoulder. Drsg CDI. Pt report some discomfort from tegaderm overlapping into axilla - tegaderm edge peeled back and pt reports relief. CMS intact. Pedal pulses palpable. Edema present in BLEs. Denies cp, sob, calf pain, nausea - reporting some SOB on exertion. Tolerating regular diet. Pt up with SBA. Lungs CTA. Voiding adequately. Able to make needs known. Call light within reach. Continue to monitor.

## 2017-05-16 NOTE — PROGRESS NOTES
Orthopaedic Surgery Progress Note     E: sob last evening, CXR stable, medicine evaluated    S: Pain well controlled, interscalene block has dissipated.   Persistent mild paresthesia to thumb, feels it may be baseline but otherwise no n/t/weakness.  Denies current cp/sob.  Discussed activity restrictions, surgery.  Plan for home in next couple days.      O:    Intake/Output Summary (Last 24 hours) at 05/16/17 0837  Last data filed at 05/16/17 0104   Gross per 24 hour   Intake             1617 ml   Output              880 ml   Net              737 ml     Vitals:    05/15/17 2026 05/16/17 0038 05/16/17 0619 05/16/17 0802   BP: 107/47 112/60 139/62 110/60   BP Location:    Left arm   Pulse:  82  88   Resp: 20 17 16 17   Temp: 96.5  F (35.8  C) 97  F (36.1  C) 97  F (36.1  C) 99.1  F (37.3  C)   TempSrc: Oral Oral Oral Oral   SpO2: 92% 92% 92% 91%   Weight:       Height:               Exam:  Gen: No acute distress, resting comfortably in bed.  Resp: Non-labored breathing  RUE   -dressings are c/d/i, small dime size strikethrough at distal aquacel   -moderate swelling throughout shoulder   -CMS intact    -motor intact to ehl/fhl/ta/gsc    -SILT to sp/dp/sural/saph/tibial    -foot wwp, cap refill <2 sec, DP 2+ palpable    No results found for: HGB  No results found for: PLT     Post-Op Plan:  Assessment/Plan: Niraj Pimentel is a 61M with hx metastatic breast cancer with pathologic right humerus fracture now s/p right proximal humerus ORIF with cement packing on 5/15/2017 with Dr. Nguyen.     Activity: Up with assist, 5 lbs lifting restriction  Weight bearing status: NWB RUE,    Antibiotics: Ancef x 24 hours.  Diet: Begin with clear fluids and progress diet as tolerated.   DVT prophylaxis: mechanical only  Bracing/Splinting: sling for comfort  Wound Care: aquacel x 7 days    Pain management: interscalene block, transition from IV to orals as tolerated.   X-rays: XR PACU right proximal humerus  Physical Therapy: sling for  comfort, AROM only, no PROM, ADL's.   Occupational Therapy: eval and treat  Labs: Trend Hgb on POD #1, 2, 3   Cultures: Pending, follow culture results closely.   Consults: PT, OT. medicine, appreciate assistance in caring for this patient.   Follow-up: Clinic with Dr. Nguyen in 2 weeks      Disposition: Pending progress with therapies, pain control on orals, and medical stability, anticipate discharge to home on POD #2-3.     Future Appointments  Date Time Provider Department Center   5/16/2017 8:00 AM Tara Rai, OT [facility] [place]   5/16/2017 1:00 PM Madalyn Ballard, JAGDEEP [facility] [place]   5/17/2017 8:30 AM Emilie Ely, PT [facility]   5/31/2017 8:45 AM Camacho Nguyen MD [facility]         Bry Mccoy MD  Orthopaedic Surgery PGY-4  [phone]        For questions about this patient, please attempt to contact me at my pager prior to contacting the orthopaedics resident on call. Thank you!

## 2017-05-16 NOTE — PLAN OF CARE
Problem: Goal Outcome Summary  Goal: Goal Outcome Summary  OT eval completed and treatment initiated. Pt will be returning to his home with his wife which is an appropriate discharge plan as his wife has been assisting him for past 2 months while he was immobilized in brace.

## 2017-05-16 NOTE — PLAN OF CARE
Problem: Individualization  Goal: Patient Preferences  Pt A&O x's 4. VSS. Afebrile. 02 sats in low 90s on 3LPM.  Lungs clear. Denies SOB, chest pain or nausea. Tolerating regular diet. Bowel sound active in all quadrants. Had BM yesterday after the surgery and reports passing gas. Voiding adequately into the urinal in the bathroom.  Pain well managed with prn oxycodone and ice pack. CMS and neuro's are intact, reports right thumb numbness from nerve block, also reports  baseline numbness. Dressing clean, dry and intact. Bilateral pcds are on . PIV patent and infusing. Pt slept between care is able to make needs known, call light with in reach. Will continue to monitor.

## 2017-05-17 ENCOUNTER — APPOINTMENT (OUTPATIENT)
Dept: OCCUPATIONAL THERAPY | Facility: CLINIC | Age: 62
DRG: 493 | End: 2017-05-17
Attending: ORTHOPAEDIC SURGERY
Payer: COMMERCIAL

## 2017-05-17 VITALS
SYSTOLIC BLOOD PRESSURE: 143 MMHG | RESPIRATION RATE: 16 BRPM | BODY MASS INDEX: 41.19 KG/M2 | HEART RATE: 97 BPM | HEIGHT: 70 IN | TEMPERATURE: 98.4 F | OXYGEN SATURATION: 94 % | DIASTOLIC BLOOD PRESSURE: 77 MMHG | WEIGHT: 287.7 LBS

## 2017-05-17 LAB
ANION GAP SERPL CALCULATED.3IONS-SCNC: 6 MMOL/L (ref 3–14)
BUN SERPL-MCNC: 9 MG/DL (ref 7–30)
CALCIUM SERPL-MCNC: 9.3 MG/DL (ref 8.5–10.1)
CHLORIDE SERPL-SCNC: 103 MMOL/L (ref 94–109)
CO2 SERPL-SCNC: 31 MMOL/L (ref 20–32)
CREAT SERPL-MCNC: 0.65 MG/DL (ref 0.66–1.25)
GFR SERPL CREATININE-BSD FRML MDRD: ABNORMAL ML/MIN/1.7M2
GLUCOSE SERPL-MCNC: 136 MG/DL (ref 70–99)
HGB BLD-MCNC: 8 G/DL (ref 13.3–17.7)
MAGNESIUM SERPL-MCNC: 2 MG/DL (ref 1.6–2.3)
POTASSIUM SERPL-SCNC: 3.5 MMOL/L (ref 3.4–5.3)
SODIUM SERPL-SCNC: 140 MMOL/L (ref 133–144)

## 2017-05-17 PROCEDURE — 85018 HEMOGLOBIN: CPT | Performed by: ORTHOPAEDIC SURGERY

## 2017-05-17 PROCEDURE — 97535 SELF CARE MNGMENT TRAINING: CPT | Mod: GO | Performed by: OCCUPATIONAL THERAPIST

## 2017-05-17 PROCEDURE — 99232 SBSQ HOSP IP/OBS MODERATE 35: CPT | Performed by: INTERNAL MEDICINE

## 2017-05-17 PROCEDURE — 36592 COLLECT BLOOD FROM PICC: CPT | Performed by: ORTHOPAEDIC SURGERY

## 2017-05-17 PROCEDURE — 97110 THERAPEUTIC EXERCISES: CPT | Mod: GO | Performed by: OCCUPATIONAL THERAPIST

## 2017-05-17 PROCEDURE — 25000125 ZZHC RX 250: Performed by: INTERNAL MEDICINE

## 2017-05-17 PROCEDURE — 25000132 ZZH RX MED GY IP 250 OP 250 PS 637: Performed by: ORTHOPAEDIC SURGERY

## 2017-05-17 PROCEDURE — 25000132 ZZH RX MED GY IP 250 OP 250 PS 637: Performed by: INTERNAL MEDICINE

## 2017-05-17 PROCEDURE — 40000133 ZZH STATISTIC OT WARD VISIT: Performed by: OCCUPATIONAL THERAPIST

## 2017-05-17 PROCEDURE — 80048 BASIC METABOLIC PNL TOTAL CA: CPT | Performed by: ORTHOPAEDIC SURGERY

## 2017-05-17 PROCEDURE — 25000128 H RX IP 250 OP 636: Performed by: INTERNAL MEDICINE

## 2017-05-17 PROCEDURE — 83735 ASSAY OF MAGNESIUM: CPT | Performed by: ORTHOPAEDIC SURGERY

## 2017-05-17 RX ORDER — AMOXICILLIN 250 MG
1-2 CAPSULE ORAL 2 TIMES DAILY
Qty: 60 TABLET | Refills: 0 | Status: SHIPPED | OUTPATIENT
Start: 2017-05-17 | End: 2017-05-17

## 2017-05-17 RX ORDER — AMLODIPINE BESYLATE 5 MG/1
5 TABLET ORAL DAILY
Qty: 15 TABLET | Refills: 0 | Status: SHIPPED | OUTPATIENT
Start: 2017-05-17

## 2017-05-17 RX ORDER — TRAZODONE HYDROCHLORIDE 50 MG/1
50 TABLET, FILM COATED ORAL AT BEDTIME
Qty: 90 TABLET | COMMUNITY
Start: 2017-05-17

## 2017-05-17 RX ADMIN — AMLODIPINE BESYLATE 5 MG: 5 TABLET ORAL at 09:01

## 2017-05-17 RX ADMIN — ATENOLOL 50 MG: 25 TABLET ORAL at 09:01

## 2017-05-17 RX ADMIN — GABAPENTIN 300 MG: 300 CAPSULE ORAL at 09:01

## 2017-05-17 RX ADMIN — ACETAMINOPHEN 975 MG: 325 TABLET, FILM COATED ORAL at 13:26

## 2017-05-17 RX ADMIN — MORPHINE SULFATE 30 MG: 15 TABLET, EXTENDED RELEASE ORAL at 09:02

## 2017-05-17 RX ADMIN — OMEPRAZOLE 20 MG: 20 CAPSULE, DELAYED RELEASE ORAL at 06:42

## 2017-05-17 RX ADMIN — ACETAMINOPHEN 400 MCG: 400 TABLET ORAL at 09:01

## 2017-05-17 RX ADMIN — SODIUM CHLORIDE, PRESERVATIVE FREE 5 ML: 5 INJECTION INTRAVENOUS at 13:12

## 2017-05-17 RX ADMIN — GABAPENTIN 300 MG: 300 CAPSULE ORAL at 13:27

## 2017-05-17 RX ADMIN — CHOLECALCIFEROL TAB 25 MCG (1000 UNIT) 2000 UNITS: 25 TAB at 09:02

## 2017-05-17 RX ADMIN — B-COMPLEX W/ C & FOLIC ACID TAB 1 TABLET: TAB at 09:02

## 2017-05-17 RX ADMIN — SODIUM CHLORIDE, PRESERVATIVE FREE 5 ML: 5 INJECTION INTRAVENOUS at 07:35

## 2017-05-17 RX ADMIN — SODIUM CHLORIDE, PRESERVATIVE FREE 5 ML: 5 INJECTION INTRAVENOUS at 13:23

## 2017-05-17 RX ADMIN — ACETAMINOPHEN 975 MG: 325 TABLET, FILM COATED ORAL at 06:42

## 2017-05-17 RX ADMIN — POLYETHYLENE GLYCOL 3350 17 G: 17 POWDER, FOR SOLUTION ORAL at 09:02

## 2017-05-17 RX ADMIN — SENNOSIDES AND DOCUSATE SODIUM 1 TABLET: 8.6; 5 TABLET ORAL at 09:02

## 2017-05-17 RX ADMIN — Medication 1000 MCG: at 10:19

## 2017-05-17 NOTE — PROGRESS NOTES
Care Coordinator- Discharge Planning     Admission Date/Time:  5/15/2017  Attending MD:  Camacho Nguyen*     Data  Date of initial CC assessment:  5/17/2017  Chart reviewed, discussed with interdisciplinary team.   Patient was admitted for:   1. S/P shoulder surgery    2. Benign essential hypertension    3. Dyspnea, unspecified type         Assessment  Concerns with insurance coverage for discharge needs: None.  Current Living Situation: Patient lives with spouse.  Support System: Supportive and Involved  Services Involved: DME  Transportation: Family or Friend will provide  Barriers to Discharge: None      Coordination of Care and Referrals: Oximetry overnight study ordered by Dr. Oreilly. Patient having episodes of dyspnea. Oximeter orders sent to [facility] in [facility], phone [phone] fax [phone]. No further discharge concerns. CC to follow as needed.      Plan  Anticipated Discharge Date:  5/17/2017  Anticipated Discharge Plan:  Home     CTS Handoff completed:  YES    Makenzie Hoang RN, BSN  Care Coordinator, 8A  Phone [phone]  Pager [phone]

## 2017-05-17 NOTE — PLAN OF CARE
Problem: Goal Outcome Summary  Goal: Goal Outcome Summary  OT-Pt continues to make progress and is moving well. He has met OT goals for ADL's and is ready for discharge to home with assist from wife as needed.

## 2017-05-17 NOTE — DISCHARGE SUMMARY
Orthopaedic Surgery  Discharge Summary    Niraj Pimentel  : 1955    Date of Service: 2017 7:25 AM      Date of Admission:  5/15/2017  Date of Discharge::  2017   Attending Physician:  Patrick    Discharge Diagnosis:  S/p humerus ORIF    Procedures Performed:  ORIF humerus on 5/15    Future Appointments:  Date Time Provider Department Center   2017 9:00 AM Tara Rai, JAGDEEP [facility]   2017 8:45 AM Camacho Nguyen MD [facility]       Medications Prior to Admission:  Prescriptions Prior to Admission   Medication Sig Dispense Refill Last Dose     Sennosides (SENNA) 8.6 MG CAPS Take 1 tablet by mouth 3 times daily   5/15/2017 at 0515     MORPHINE SULFATE PO Take 30 mg by mouth 2 times daily   5/15/2017 at 0515     CEFUROXIME AXETIL PO Take 500 mg by mouth 2 times daily Last dose 17 at Unknown time     nystatin (MYCOSTATIN) cream Apply 1 applicator topically daily as needed for dry skin   2017 at Unknown time     amLODIPine (NORVASC) 10 MG tablet Take 10 mg by mouth daily   5/15/2017 at 0515     atenolol-chlorthalidone (TENORETIC) 50-25 MG per tablet Take 1 tablet by mouth daily   2017 at 0515     vitamin B complex with vitamin C (VITAMIN  B COMPLEX) TABS tablet Take 1 tablet by mouth daily   2017 at 2000     Cholecalciferol (VITAMIN D-3 PO) Take 2,000 Units by mouth 2 times daily   Past Week at Unknown time     docusate sodium (COLACE) 100 MG capsule Take by mouth daily   5/15/2017 at 0515     Multiple Vitamins-Minerals (EMERGEN-C IMMUNE PO) Take by mouth as needed   Past Week at Unknown time     GABAPENTIN PO Take 300 mg by mouth 3 times daily    5/15/2017 at 0515     LACTOBACILLUS PO Take 1 tablet by mouth daily    2017 at 2000     magnesium 250 MG tablet Take 1 tablet by mouth 3 times daily    5/15/2017 at 0515     MELATONIN PO Take 3 mg by mouth At Bedtime   2017 at 2000     OMEPRAZOLE PO Take 20 mg by mouth   5/15/2017 at  0515     potassium chloride (KLOR-CON) 20 MEQ Packet Take 20 mEq by mouth 3 times daily    5/15/2017 at 0515     PROCHLORPERAZINE MALEATE PO Take 10 mg by mouth every 6 hours as needed for nausea or vomiting   Past Month at Unknown time     TRAZODONE HCL PO Take 100 mg by mouth At Bedtime   5/14/2017 at 2000     OXYCODONE HCL PO Take 5 mg by mouth every 4 hours as needed   More than a month at Unknown time     Biotin 1 MG CAPS Take 1 tablet by mouth daily    5/7/2017     DEXAMETHASONE PO Take 4 mg by mouth Takes with chemo   4/21/2017     Flaxseed, Linseed, (FLAXSEED OIL) 1000 MG CAPS    5/7/2017     FOLIC ACID PO Take 400 mcg by mouth daily   5/7/2017     garlic 150 MG TABS tablet Take 150 mg by mouth daily   5/7/2017     leuprolide (LUPRON DEPOT-PED) 30 MG kit Inject 30 mg into the muscle every 4 months Last dose 2/2017 5/12/2017     Omega-3 Fatty Acids (OMEGA 3 PO) Take 1,000 mg by mouth   5/7/2017       Discharge Medications:  Current Discharge Medication List      START taking these medications    Details   senna-docusate (SENOKOT-S;PERICOLACE) 8.6-50 MG per tablet Take 1-2 tablets by mouth 2 times daily  Qty: 60 tablet, Refills: 0    Associated Diagnoses: S/P shoulder surgery         CONTINUE these medications which have NOT CHANGED    Details   Sennosides (SENNA) 8.6 MG CAPS Take 1 tablet by mouth 3 times daily      MORPHINE SULFATE PO Take 30 mg by mouth 2 times daily      CEFUROXIME AXETIL PO Take 500 mg by mouth 2 times daily Last dose 5/13/17      nystatin (MYCOSTATIN) cream Apply 1 applicator topically daily as needed for dry skin      amLODIPine (NORVASC) 10 MG tablet Take 10 mg by mouth daily      atenolol-chlorthalidone (TENORETIC) 50-25 MG per tablet Take 1 tablet by mouth daily      vitamin B complex with vitamin C (VITAMIN  B COMPLEX) TABS tablet Take 1 tablet by mouth daily      Cholecalciferol (VITAMIN D-3 PO) Take 2,000 Units by mouth 2 times daily      docusate sodium (COLACE) 100 MG capsule  Take by mouth daily      Multiple Vitamins-Minerals (EMERGEN-C IMMUNE PO) Take by mouth as needed      GABAPENTIN PO Take 300 mg by mouth 3 times daily       LACTOBACILLUS PO Take 1 tablet by mouth daily       magnesium 250 MG tablet Take 1 tablet by mouth 3 times daily       MELATONIN PO Take 3 mg by mouth At Bedtime      OMEPRAZOLE PO Take 20 mg by mouth      potassium chloride (KLOR-CON) 20 MEQ Packet Take 20 mEq by mouth 3 times daily       PROCHLORPERAZINE MALEATE PO Take 10 mg by mouth every 6 hours as needed for nausea or vomiting      TRAZODONE HCL PO Take 100 mg by mouth At Bedtime      OXYCODONE HCL PO Take 5 mg by mouth every 4 hours as needed      Biotin 1 MG CAPS Take 1 tablet by mouth daily       DEXAMETHASONE PO Take 4 mg by mouth Takes with chemo      Flaxseed, Linseed, (FLAXSEED OIL) 1000 MG CAPS       FOLIC ACID PO Take 400 mcg by mouth daily      garlic 150 MG TABS tablet Take 150 mg by mouth daily      leuprolide (LUPRON DEPOT-PED) 30 MG kit Inject 30 mg into the muscle every 4 months Last dose 2/2017      Omega-3 Fatty Acids (OMEGA 3 PO) Take 1,000 mg by mouth             Brief History of Presenting Illness:  61M with metastatic breast cancer and pathologic right humerus fracture now s/p ORIF humerus     Hospital Course:  Uncomplicated.  Admitted to hospital for routine post-op cares.  Occupational therapy both worked with the patient.  On the day of discharge he had adequate pain control on oral meds, was tolerating a diet, voiding independently, and was ambulating safely.  The patient was determined to be safe to d/c to home      Discharge Instructions    Discharge Procedure Orders  Reason for your hospital stay   Order Comments: 61M with metastatic breast cancer and pathologic humerus fx s/p ORIF     Discharge Instructions   Order Comments: Post Operative Instructions for Niraj Pimentel is a 61 year old male    Surgery: Right humerus ORIF on 5/15/17    If you have any questions contact   Patrick at the following number: [facility] call [phone].    Follow up appointment:   You are scheduled to follow up with Dr. Nguyen approximately 2weeks after your surgery.           Anticoagulation:   Continue to walk and stay active to help minimize your risk of blood clot.    Activity:   -continue non weight bearing to right upper extremity, active ROM to shoulder, continue with active elbow/wrist/hand.   -use the sling as needed for comfort    Pain Medications:   -Take pain medications as prescribed.     -Do not drive while on pain medications or until your leg feels well enough to do so.      Bandage Care and Showering:   -You can shower with the adhesive bandage covering your shoulder at the time of discharge.    -Remove the adhesive bandage 10 days after your surgery.  This can be removed at home.  Once removed, it is common to have a few scabs.  Let the scabs fall off on their own - they will do so over the next week or two.  The sutures are resorbable and nothing needs to be removed.    --Once the bandage is removed, you can shower without covering the incision.  Do not soak or bathe the incision in water.  Do not scrub the incision.  Just let the water from the shower run over the incision.    --Contact Dr. Nguyen or his staff if there is any drainage from the incision or redness.    Shoulder Swelling and Icing  -Continue icing the shoulder 5-6 times per day for approximately 20 minutes each time.  This will help with swelling.  Keep the arm elevated with pillows to prevent dependent swelling.    Contact clinic if you note any of the following:  -Fevers greater than 101.5 chills  -Increased pain, redness, swelling or discharge at the surgical site.   -During regular business hours call Dr Nguyen's office and request to speak with his nurse or the triage nurses.   -After hours or on weekends call the hospital  at [phone] and ask to speak with the Orthopaedic resident on call.      Full Code         Discharge Disposition:  home    Bry Mccoy MD  Orthopaedic Surgery PGY-4  [phone]

## 2017-05-17 NOTE — PLAN OF CARE
Problem: Perioperative Period (Adult)  Goal: Signs and Symptoms of Listed Potential Problems Will be Absent or Manageable (Perioperative Period)  Signs and symptoms of listed potential problems will be absent or manageable by discharge/transition of care (reference Perioperative Period (Adult) CPG).   Outcome: Improving  Pt A&O x's 4. VSS. Titrate O2 to 2 lpm and sats at 93-95%. Lungs sounds diminished encouraged IS, deep breath and cough. No chest pain but has a little SOB after a walk. Tolerating regular diet. Bowel sounds active in all quadrants. LBM 05/15 after surgery and passing gas. Voiding adequate amount to the urinal.  Up with SBA to the bathroom. Right shoulder Aquacel dressing has scant amount of drainage. Ice applied. Sling in place for comfort. Pain well managed with long acting MS Contin.  Has baseline numbness in hands and feet due to neuropathy. Port A cath SL on left side of the chest. Pt is resting comfortably between cares. Able to make needs know, call light in reach. Will continue to monitor.

## 2017-05-17 NOTE — PROGRESS NOTES
Orthopaedic Surgery Progress Note     E: no acute issues.  Worked with OT, passed.  Dyspnea with activity, PO oxycodone held.      S: pain is well controlled, prefers to only take long acting and no short acting.  Denies any new n/t/weakness.  Plan for home today from ortho standpoint if medically cleared.      O:      Vitals:    05/16/17 0931 05/16/17 1603 05/16/17 1624 05/17/17 0047   BP: 105/58 130/57  104/42   BP Location: Left arm   Left arm   Pulse:    86   Resp:    16   Temp:  98.1  F (36.7  C)  99  F (37.2  C)   TempSrc:  Oral  Oral   SpO2:  95% 96% 95%   Weight:       Height:               Exam:  Gen: No acute distress, resting comfortably in bed.  Resp: Non-labored breathing  RUE   -dressings are c/d/i, small dime size strikethrough at distal aquacel   -moderate swelling throughout shoulder   -CMS intact    -motor intact to ehl/fhl/ta/gsc    -SILT to sp/dp/sural/saph/tibial    -foot wwp, cap refill <2 sec, DP 2+ palpable    Lab Results   Component Value Date    HGB 8.1 (L) 05/16/2017     Lab Results   Component Value Date     05/16/2017        Post-Op Plan:  Assessment/Plan: Niraj Pimentel is a 61M with hx metastatic breast cancer with pathologic right humerus fracture now s/p right proximal humerus ORIF with cement packing on 5/15/2017 with Dr. Nguyen.     Activity: Up with assist, 5 lbs lifting restriction  Weight bearing status: NWB RUE,    Antibiotics: Ancef x 24 hours.  Diet: Begin with clear fluids and progress diet as tolerated.   DVT prophylaxis: mechanical only  Bracing/Splinting: sling for comfort  Wound Care: aquacel x 7 days    Pain management: interscalene block, transition from IV to orals as tolerated.   X-rays: XR PACU right proximal humerus  Physical Therapy: sling for comfort, AROM only, no PROM, ADL's.   Occupational Therapy: eval and treat  Labs: Trend Hgb on POD #1, 2, 3   Cultures: Pending, follow culture results closely.   Consults: PT, OT. medicine, appreciate assistance in  caring for this patient.   Follow-up: Clinic with Dr. Nguyen in 2 weeks      Disposition: Pending progress with therapies, pain control on orals, and medical stability, anticipate discharge to home on POD #2-3.     Future Appointments  Date Time Provider Department Center   5/16/2017 8:00 AM Tara Rai, OT [facility] [place]   5/16/2017 1:00 PM Madalyn Ballard, OT [facility] [place]   5[facility]17/2017 8:30 [facility]   5/31/2017 8:45 AM Camacho Nguyen MD [facility]         Bry Mccoy MD  Orthopaedic Surgery PGY-4  [phone]        For questions about this patient, please attempt to contact me at my pager prior to contacting the orthopaedics resident on call. Thank you!

## 2017-05-17 NOTE — PLAN OF CARE
Problem: Individualization  Goal: Patient Preferences  Pt A&O x's 4. VSS. Afebrile. 02 sats in 90s on 2 LPM. Lungs diminished, IS encouraged. Denies SOB, chest pain or nausea. Tolerating regular diet. Bowel sound active in all quadrants. Had BM the day of surgery after the procedure  and reports passing gas. Voiding adequately  Pain well managed with scheduled pain med. Pt declined prn pain med. CMS and neuro's are intact, reports right thumb numbness is improved. Right hand edema improved, pt is encouraged to keep elevated. Dressing clean, dry and intact. Bilateral pcds are on. PIV patent and infusing. Pt slept between care is able to make needs known, call light with in reach. Will continue to monitor.

## 2017-05-17 NOTE — PLAN OF CARE
Pt  Discharged at 1400. He was given oral and written instructions and take home medications His rebecca cath was deacessed and flushed with heparin. He was axious to go home He will be doing a pulse oximeter test at home.

## 2017-05-17 NOTE — PROGRESS NOTES
"[facility] Internal Medicine Progress Note            Interval History:   Record reviewed.  Seen earlier with RN.  Anxious for DC home.  Clinically feels much improved -  Dyspnea less.  Adequate pain control with MS contin alone.  On O2 overnight 2 liters then 1 liter.  No drop in sat recorded.  Sat 95-96% RA today on RA.  Ambulated in fraga without LH.  No exertional dyspnea.  Improved depth of inspiration with decrease orthopnea.   Feels baseline.   No CP, appreciable cough.  No nausea, reflux, abd pain.  Reg BM.   Voiding OK.  Sats PTA ccas drop into 80s but no correlating dyspnea.             Medications:   All medications reviewed today          Physical Exam:   Blood pressure 143/77, pulse 97, temperature 98.4  F (36.9  C), resp. rate 16, height 1.778 m (5' 10\"), weight 130.5 kg (287 lb 11.2 oz), SpO2 94 %.    Intake/Output Summary (Last 24 hours) at 05/16/17 1516  Last data filed at 05/16/17 0104   Gross per 24 hour   Intake                0 ml   Output              300 ml   Net             -300 ml          General:  Alert.  Appropriate.  No distress.  Off O2.     Heent:      Neck:    Skin:    Chest:  Decrease BS right base persists but less dramatic. .      Cardiac:  Reg without gallop, murmur.  No JVD.     Abdomen:  Non distended, soft, non tender.  BS normal.     Extremities:  Perfused.  Trace to 1 plus edema.  No calf, thigh tenderness to suggest DVT.     Neuro:            Data:     Results for orders placed or performed during the hospital encounter of 05/15/17 (from the past 24 hour(s))   Hemoglobin   Result Value Ref Range    Hemoglobin 8.0 (L) 13.3 - 17.7 g/dL   Basic metabolic panel   Result Value Ref Range    Sodium 140 133 - 144 mmol/L    Potassium 3.5 3.4 - 5.3 mmol/L    Chloride 103 94 - 109 mmol/L    Carbon Dioxide 31 20 - 32 mmol/L    Anion Gap 6 3 - 14 mmol/L    Glucose 136 (H) 70 - 99 mg/dL    Urea Nitrogen 9 7 - 30 mg/dL    Creatinine 0.65 (L) 0.66 - 1.25 mg/dL    GFR Estimate >90  Non "  GFR Calc   >60 mL/min/1.7m2    GFR Estimate If Black >90   GFR Calc   >60 mL/min/1.7m2    Calcium 9.3 8.5 - 10.1 mg/dL   Magnesium   Result Value Ref Range    Magnesium 2.0 1.6 - 2.3 mg/dL                Assessment and Plan:   1. Open reduction internal fixation right proximal humerus with cement packing. Indication: Right proximal humerus pathologic fracture. Adequate pain control on PTA MS Contin.    2. Metastatic breast cancer diagnosed 06/2014:   a. Right mastectomy.   b. Chemotherapy with last round completed 3 weeks preop. Associated neutropenia.   c. Metastatic disease with diffuse skeletal metastases, lymph node, hepatic and possibly left lung involvement with what sounds like prior left-sided thoracentesis.   3. History of hypercalcemia associated with malignancy.   4. Acute blood loss anemia, adequate.  Stable.   5. History of left-sided Bell's palsy (persistent on exam).   6. Recent completion of antibiotics for left lower extremity cellulitis.   7. Hypokalemia secondary to diuretic therapy.   8. Hypertension, borderline control on reduced meds.   9. Peripheral neuropathy, presumably related to chemotherapy.   10. History of positive PPD, status post protracted antibiotics.   11. Gastroesophageal reflux disease on omeprazole.   12. Borderline diabetes with A1c of 6.1%.   13. Dyspnea postop - seems more consistent with restriction related to elevated right hemidiaphragm (noted on CXR and by exam), aggravated by supine position and hypoventilation contributed to by sedation related to opiates. Improved.  Back to baseline per pt.  Basis for O2 administration over night not clear - consistent sat drop not recorded.      PLAN:  1)  Reviewed issue of not clearly knowing sat on RA with sleep.  Adequate RA during day.  Clinically feels back to baseline.  On PTA opiate dose of MS contin.  Plan for wife to monitor pt's sat with home sat monitor.  Overnight oximetry study arranged  for home tonight.  Results to PMD.  To resume tenoretic and PTA KCL 5/18 with continued reduced norvasc 5 mg.  PMD/oncology follow up to review BP, f/u labs.  Dr. Lu contacted with the above.            Attestation:  I have reviewed today's vital signs, notes, medications, labs and imaging.     Oswald Oreilly MD

## 2017-05-18 LAB — COPATH REPORT: NORMAL

## 2017-05-24 LAB — COPATH REPORT: NORMAL

## 2017-05-31 ENCOUNTER — OFFICE VISIT (OUTPATIENT)
Dept: ORTHOPEDICS | Facility: CLINIC | Age: 62
End: 2017-05-31

## 2017-05-31 VITALS — WEIGHT: 298.5 LBS | BODY MASS INDEX: 44.21 KG/M2 | HEIGHT: 69 IN

## 2017-05-31 DIAGNOSIS — M84.521G PATHOLOGICAL FRACTURE OF RIGHT HUMERUS DUE TO NEOPLASTIC DISEASE WITH DELAYED HEALING, SUBSEQUENT ENCOUNTER: Primary | ICD-10-CM

## 2017-05-31 NOTE — NURSING NOTE
"Reason For Visit:   Chief Complaint   Patient presents with     Surgical Followup     S/P Right Proximal Humerus Tumor Curettage and Internal Fixation of Pathologic Fracture. DOS: 05/15/2017.       Pain Assessment  Patient Currently in Pain: Yes  0-10 Pain Scale: 4  Primary Pain Location: Shoulder  Pain Orientation: Right  Pain Descriptors: Discomfort, Aching, Dull  Alleviating Factors: Pain medication, Rest  Aggravating Factors: Movement, Bending, Sitting, Lying               HEIGHT: 5' 9\", WEIGHT: 298 lbs 8 oz, BMI: Body mass index is 44.08 kg/(m^2).      Current Outpatient Prescriptions   Medication Sig Dispense Refill     traZODone (DESYREL) 50 MG tablet Take 1 tablet (50 mg) by mouth At Bedtime 90 tablet      amLODIPine (NORVASC) 5 MG tablet Take 1 tablet (5 mg) by mouth daily 15 tablet 0     Sennosides (SENNA) 8.6 MG CAPS Take 1 tablet by mouth 3 times daily       MORPHINE SULFATE PO Take 30 mg by mouth 2 times daily       OXYCODONE HCL PO Take 5 mg by mouth every 4 hours as needed       nystatin (MYCOSTATIN) cream Apply 1 applicator topically daily as needed for dry skin       atenolol-chlorthalidone (TENORETIC) 50-25 MG per tablet Take 1 tablet by mouth daily       vitamin B complex with vitamin C (VITAMIN  B COMPLEX) TABS tablet Take 1 tablet by mouth daily       Biotin 1 MG CAPS Take 1 tablet by mouth daily        Cholecalciferol (VITAMIN D-3 PO) Take 2,000 Units by mouth 2 times daily       docusate sodium (COLACE) 100 MG capsule Take by mouth daily       Multiple Vitamins-Minerals (EMERGEN-C IMMUNE PO) Take by mouth as needed       Flaxseed, Linseed, (FLAXSEED OIL) 1000 MG CAPS        FOLIC ACID PO Take 400 mcg by mouth daily       GABAPENTIN PO Take 300 mg by mouth 3 times daily        garlic 150 MG TABS tablet Take 150 mg by mouth daily       LACTOBACILLUS PO Take 1 tablet by mouth daily        leuprolide (LUPRON DEPOT-PED) 30 MG kit Inject 30 mg into the muscle every 4 months Last dose 2/2017       " magnesium 250 MG tablet Take 1 tablet by mouth 3 times daily        MELATONIN PO Take 3 mg by mouth At Bedtime       Omega-3 Fatty Acids (OMEGA 3 PO) Take 1,000 mg by mouth       OMEPRAZOLE PO Take 20 mg by mouth       potassium chloride (KLOR-CON) 20 MEQ Packet Take 20 mEq by mouth 3 times daily             Allergies   Allergen Reactions     Lisinopril Cough

## 2017-05-31 NOTE — PROGRESS NOTES
This patient is doing quite well in 2 weeks out from surgery. He can abduct almost to 90. His pain is well-controlled and his incision has healed well. There is no erythema or drainage from the surgery site.  Instructed him in some basic exercises. He is still limited to 2 pounds of lifting over the next month. I would like to see him in 2 months with x-rays of the humerus to make sure that it is Healing well. I have answered all of his questions.

## 2017-05-31 NOTE — MR AVS SNAPSHOT
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      Who to contact     Please call your clinic at [phone] to:    Ask questions about your health    Make or cancel appointments    Discuss your medicines    Learn about your test results    Speak to your doctor   If you have compliments or concerns about an experience at your clinic, or if you wish to file a complaint, please contact [facility] Patient Relations at [phone] or email us at [email or web address]         Additional Information About Your Visit        MyChart Information     [facility] is an electronic gateway that provides easy, online access to your medical records. With [facility], you can request a clinic appointment, read your test results, renew a prescription or communicate with your care team.     To sign up for [email or web address] visit the website at [email or web address]   You will be asked to enter the access code listed below, as well as some personal information. Please follow the directions to create your username and password.     Your access code is: [email or web address]  Expires: 2017  6:30 AM     Your access code will  in 90 days. If you need help or a new code, please contact your [facility] or call [phone] for assistance.        Care EveryWhere ID     This is your Care EveryWhere ID. This could be used by other organizations to access your [facility]  "records  [id number]        Your Vitals Were     Height BMI (Body Mass Index)                1.753 m (5' 9\") 44.08 kg/m2           Blood Pressure from Last 3 Encounters:   05/17/17 143/77    Weight from Last 3 Encounters:   05/31/17 135.4 kg (298 lb 8 oz)   05/15/17 130.5 kg (287 lb 11.2 oz)   03/22/17 134.1 kg (295 lb 9.6 oz)              Today, you had the following     No orders found for display       Primary Care Provider Office Phone # Fax #    Genaro Lu [phone]       [facility] [address]  [facility] [address]        Thank you!     Thank you for choosing [facility]  for your care. Our goal is always to provide you with excellent care. Hearing back from our patients is one way we can continue to improve our services. Please take a few minutes to complete the written survey that you may receive in the mail after your visit with us. Thank you!             Your Updated Medication List - Protect others around you: Learn how to safely use, store and throw away your medicines at www.disposemymeds.org.          This list is accurate as of: 5/31/17  9:07 AM.  Always use your most recent med list.                   Brand Name Dispense Instructions for use    amLODIPine 5 MG tablet    NORVASC    15 tablet    Take 1 tablet (5 mg) by mouth daily       atenolol-chlorthalidone 50-25 MG per tablet    TENORETIC     Take 1 tablet by mouth daily       Biotin 1 MG Caps      Take 1 tablet by mouth daily       docusate sodium 100 MG capsule    COLACE     Take by mouth daily       EMERGEN-C IMMUNE PO      Take by mouth as needed       flaxseed oil 1000 MG Caps          FOLIC ACID PO      Take 400 mcg by mouth daily       GABAPENTIN PO      Take 300 mg by mouth 3 times daily       garlic 150 MG Tabs tablet      Take 150 mg by mouth daily       LACTOBACILLUS PO      Take 1 tablet by mouth daily       leuprolide 30 MG kit    LUPRON DEPOT-PED     Inject 30 mg into the muscle " every 4 months Last dose 2/2017       magnesium 250 MG tablet      Take 1 tablet by mouth 3 times daily       MELATONIN PO      Take 3 mg by mouth At Bedtime       MORPHINE SULFATE PO      Take 30 mg by mouth 2 times daily       nystatin cream    MYCOSTATIN     Apply 1 applicator topically daily as needed for dry skin       OMEGA 3 PO      Take 1,000 mg by mouth       OMEPRAZOLE PO      Take 20 mg by mouth       OXYCODONE HCL PO      Take 5 mg by mouth every 4 hours as needed       potassium chloride 20 MEQ Packet    KLOR-CON     Take 20 mEq by mouth 3 times daily       Senna 8.6 MG Caps      Take 1 tablet by mouth 3 times daily       traZODone 50 MG tablet    DESYREL    90 tablet    Take 1 tablet (50 mg) by mouth At Bedtime       vitamin B complex with vitamin C Tabs tablet      Take 1 tablet by mouth daily       VITAMIN D-3 PO      Take 2,000 Units by mouth 2 times daily

## 2017-05-31 NOTE — LETTER
5/31/2017       RE: Niraj Pimentel  [address] [facility] [address]     Dear Colleague,    Thank you for referring your patient, Niraj Pimentel, to the [facility] at [facility]. Please see a copy of my visit note below.    This patient is doing quite well in 2 weeks out from surgery. He can abduct almost to 90. His pain is well-controlled and his incision has healed well. There is no erythema or drainage from the surgery site.  Instructed him in some basic exercises. He is still limited to 2 pounds of lifting over the next month. I would like to see him in 2 months with x-rays of the humerus to make sure that it is Healing well. I have answered all of his questions.    Again, thank you for allowing me to participate in the care of your patient.      Sincerely,    Camacho Nguyen MD

## 2022-04-05 NOTE — PLAN OF CARE
Dr Ronald Vargas,     Patient has been scheduled for CLN on 5/25/2022, please send bowel prep to the [address] on file. May close encounter when done.     Thank you Problem: Perioperative Period (Adult)  Goal: Signs and Symptoms of Listed Potential Problems Will be Absent or Manageable (Perioperative Period)  Signs and symptoms of listed potential problems will be absent or manageable by discharge/transition of care (reference Perioperative Period (Adult) CPG).   Outcome: Improving  Danii note [id number]  Pt A&O x's 4. VSS. On capnography monitoring. Lungs clear. Denies SOB, CP and nausea. Tolerating regular diet. Bowel sounds active in all quadrants. LBM 05/15 after surgery and passing gas. Voiding adequate amount to the urinal. Right shoulder Aquacel dressing has scant amount of drainage. Ice applied. Sling in place for comfort.  Pain well managed with prn oxycodone and schedule tylenol. Has baseline numbness in hands and feet due to neuropathy. Port A cath infusing LR at 100 ml/hrs left side of the chest. Pt is resting comfortably between cares. Able to make needs know, call light in reach. Will continue to monitor.

## (undated) DEVICE — LIGHT HANDLE X1 31140133

## (undated) DEVICE — PREP SKIN SCRUB TRAY 4461A

## (undated) DEVICE — DRILL BIT

## (undated) DEVICE — SOL WATER IRRIG 1000ML BOTTLE 2F7114

## (undated) DEVICE — BONE CEMENT MIXEVAC HI VAC W/CARTRIDGE 0306-563-000

## (undated) DEVICE — SU VICRYL 0 CT-1 3X27" J430T

## (undated) DEVICE — SPECIMEN CONTAINER 5OZ STERILE 2600SA

## (undated) DEVICE — DRAPE U-DRAPE 1015NSD NON-STERILE

## (undated) DEVICE — GLOVE PROTEXIS W/NEU-THERA 7.0  2D73TE70

## (undated) DEVICE — SLING ARM LG 79-99157

## (undated) DEVICE — SU VICRYL 2-0 CT 36" J957H

## (undated) DEVICE — DRAPE C-ARM W/STRAPS 42X72" 07-CA104

## (undated) DEVICE — SUCTION TIP YANKAUER STR K87

## (undated) DEVICE — PREP POVIDONE IODINE SCRUB 7.5% 120ML

## (undated) DEVICE — Device

## (undated) DEVICE — BONE CEMENT NOZZLE THIN FLEX 206-515-000

## (undated) DEVICE — DRSG AQUACEL AG 3.5X9.75" HYDROFIBER 412011

## (undated) DEVICE — SPONGE LAP 18X18" X8435

## (undated) DEVICE — PREP DURAPREP REMOVER 4OZ 8611

## (undated) DEVICE — ESU PENCIL W/SMOKE EVAC NEPTUNE STRYKER 0703-046-000

## (undated) DEVICE — DRSG TEGADERM 4X4 3/4" 1626W

## (undated) DEVICE — PREP DURAPREP 26ML APL 8630

## (undated) DEVICE — LINEN BACK PACK 5440

## (undated) DEVICE — SU MONOCRYL 4-0 PS-2 18" UND Y496G

## (undated) DEVICE — SYR BULB IRRIG 50ML LATEX FREE 0035280

## (undated) DEVICE — ESU ELEC BLADE HEX-LOCKING 2.5" E1450X

## (undated) DEVICE — SOL NACL 0.9% IRRIG 1000ML BOTTLE 2F7124

## (undated) DEVICE — ESU PENCIL W/HOLSTER

## (undated) DEVICE — CAST PADDING 4" STERILE 9044S

## (undated) DEVICE — KWIRE

## (undated) DEVICE — DRSG AQUACEL AG 3.5X6.0" HYDROFIBER 412010

## (undated) DEVICE — DRSG STERI STRIP 1/2X4" R1547

## (undated) DEVICE — ESU GROUND PAD UNIVERSAL W/O CORD

## (undated) RX ORDER — BUPIVACAINE HYDROCHLORIDE 5 MG/ML
INJECTION, SOLUTION EPIDURAL; INTRACAUDAL
Status: DISPENSED
Start: 2017-05-15

## (undated) RX ORDER — ONDANSETRON 2 MG/ML
INJECTION INTRAMUSCULAR; INTRAVENOUS
Status: DISPENSED
Start: 2017-05-15

## (undated) RX ORDER — CEFAZOLIN SODIUM 1 G/50ML
SOLUTION INTRAVENOUS
Status: DISPENSED
Start: 2017-05-15

## (undated) RX ORDER — LIDOCAINE HYDROCHLORIDE 20 MG/ML
INJECTION, SOLUTION EPIDURAL; INFILTRATION; INTRACAUDAL; PERINEURAL
Status: DISPENSED
Start: 2017-05-15

## (undated) RX ORDER — CEFAZOLIN SODIUM 1 G/3ML
INJECTION, POWDER, FOR SOLUTION INTRAMUSCULAR; INTRAVENOUS
Status: DISPENSED
Start: 2017-05-15

## (undated) RX ORDER — BUPIVACAINE HYDROCHLORIDE 2.5 MG/ML
INJECTION, SOLUTION INFILTRATION; PERINEURAL
Status: DISPENSED
Start: 2017-05-15

## (undated) RX ORDER — FENTANYL CITRATE 50 UG/ML
INJECTION, SOLUTION INTRAMUSCULAR; INTRAVENOUS
Status: DISPENSED
Start: 2017-05-15